# Patient Record
Sex: FEMALE | Employment: UNEMPLOYED | URBAN - METROPOLITAN AREA
[De-identification: names, ages, dates, MRNs, and addresses within clinical notes are randomized per-mention and may not be internally consistent; named-entity substitution may affect disease eponyms.]

---

## 2022-06-03 ENCOUNTER — TELEPHONE (OUTPATIENT)
Dept: FAMILY MEDICINE | Facility: CLINIC | Age: 27
End: 2022-06-03
Payer: COMMERCIAL

## 2022-06-03 NOTE — TELEPHONE ENCOUNTER
Reason for Call:  Same Day Appointment, Requested Provider:  anyone    PCP: No primary care provider on file.    Reason for visit: possible UTI    Duration of symptoms: n/a    Have you been treated for this in the past? No    Additional comments: n/a    Can we leave a detailed message on this number? YES    Phone number patient can be reached at: Home number on file [phone] (home)    Best Time: anytime    Call taken on 6/3/2022 at 11:41 AM by Bisi Donis

## 2022-06-08 ENCOUNTER — OFFICE VISIT (OUTPATIENT)
Dept: FAMILY MEDICINE | Facility: CLINIC | Age: 27
End: 2022-06-08
Payer: COMMERCIAL

## 2022-06-08 ENCOUNTER — LAB (OUTPATIENT)
Dept: LAB | Facility: CLINIC | Age: 27
End: 2022-06-08
Payer: COMMERCIAL

## 2022-06-08 VITALS
DIASTOLIC BLOOD PRESSURE: 85 MMHG | HEART RATE: 100 BPM | RESPIRATION RATE: 12 BRPM | HEIGHT: 60 IN | WEIGHT: 130.6 LBS | BODY MASS INDEX: 25.64 KG/M2 | SYSTOLIC BLOOD PRESSURE: 117 MMHG | OXYGEN SATURATION: 100 % | TEMPERATURE: 98.4 F

## 2022-06-08 DIAGNOSIS — Z00.00 HEALTHCARE MAINTENANCE: ICD-10-CM

## 2022-06-08 DIAGNOSIS — R30.0 DYSURIA: Primary | ICD-10-CM

## 2022-06-08 LAB
ALBUMIN SERPL-MCNC: 3.7 G/DL (ref 3.4–5)
ALP SERPL-CCNC: 71 U/L (ref 40–150)
ALT SERPL W P-5'-P-CCNC: 45 U/L (ref 0–50)
ANION GAP SERPL CALCULATED.3IONS-SCNC: 7 MMOL/L (ref 3–14)
AST SERPL W P-5'-P-CCNC: 21 U/L (ref 0–45)
BILIRUB SERPL-MCNC: 0.3 MG/DL (ref 0.2–1.3)
BUN SERPL-MCNC: 12 MG/DL (ref 7–30)
CALCIUM SERPL-MCNC: 8.7 MG/DL (ref 8.5–10.1)
CHLORIDE BLD-SCNC: 108 MMOL/L (ref 94–109)
CHOLEST SERPL-MCNC: 178 MG/DL
CO2 SERPL-SCNC: 26 MMOL/L (ref 20–32)
CREAT SERPL-MCNC: 0.61 MG/DL (ref 0.52–1.04)
FASTING STATUS PATIENT QL REPORTED: NO
GFR SERPL CREATININE-BSD FRML MDRD: >90 ML/MIN/1.73M2
GLUCOSE BLD-MCNC: 91 MG/DL (ref 70–99)
HDLC SERPL-MCNC: 40 MG/DL
LDLC SERPL CALC-MCNC: 95 MG/DL
NONHDLC SERPL-MCNC: 138 MG/DL
POTASSIUM BLD-SCNC: 3.9 MMOL/L (ref 3.4–5.3)
PROT SERPL-MCNC: 7 G/DL (ref 6.8–8.8)
SODIUM SERPL-SCNC: 141 MMOL/L (ref 133–144)
TRIGL SERPL-MCNC: 213 MG/DL
TSH SERPL DL<=0.005 MIU/L-ACNC: 1.54 MU/L (ref 0.4–4)

## 2022-06-08 PROCEDURE — 80061 LIPID PANEL: CPT | Performed by: PATHOLOGY

## 2022-06-08 PROCEDURE — 84443 ASSAY THYROID STIM HORMONE: CPT | Performed by: PATHOLOGY

## 2022-06-08 PROCEDURE — 99385 PREV VISIT NEW AGE 18-39: CPT | Performed by: NURSE PRACTITIONER

## 2022-06-08 PROCEDURE — 80053 COMPREHEN METABOLIC PANEL: CPT | Performed by: PATHOLOGY

## 2022-06-08 PROCEDURE — 36415 COLL VENOUS BLD VENIPUNCTURE: CPT | Performed by: PATHOLOGY

## 2022-06-08 RX ORDER — PAROXETINE 10 MG/1
10 TABLET, FILM COATED ORAL EVERY MORNING
COMMUNITY
End: 2022-07-08

## 2022-06-08 ASSESSMENT — ENCOUNTER SYMPTOMS
HEMATURIA: 0
DYSURIA: 1
SHORTNESS OF BREATH: 0
PALPITATIONS: 0
FEVER: 0
COUGH: 0
FATIGUE: 0
CHILLS: 0

## 2022-06-08 NOTE — PROGRESS NOTES
Today's Date: Jun 8, 2022     Patient Malika Garcia 1995 presents to the clinic today to address   Chief Complaint   Patient presents with     Physical     Has a referral for a urologist but hasn't been able to contact would like assistance   Would like routine blood work              SUBJECTIVE     History of Present Illness:    26 year old female presents to clinic to establish care, physical, and to discuss dysuria. Patient recently moved to [place] from [place] for her 's work. She is going to start working at [employer] in the next few weeks. She reports a history of environmental allergies (allergic to her dog) with nasal congestion but is currently not on any medications for allergies.      Patient has been dealing with dysuria since 5/12/22.  She was initially treated via virtual MD who prescribed macrobid. She completed the course of macrobid, however, the dysuria persisted and she went to an urgent care where she was prescribed bactrim. The dysuria continued to persist and she went to the ED on 6/4/22 where she was discharged after UA, urine pregnancy test, and pelvic US were WNL. She was referred to [facility] but has not received a callback. She currently denies pelvic pain, vaginal discharge, hematuria. Denies new sexual partners. No other acute concerns/complaints at time of exam.      Review of Systems   Constitutional: Negative for chills, fatigue and fever.   Respiratory: Negative for cough and shortness of breath.    Cardiovascular: Negative for chest pain and palpitations.   Genitourinary: Positive for dysuria. Negative for hematuria, pelvic pain, vaginal bleeding and vaginal discharge.   Allergic/Immunologic: Positive for environmental allergies.   Constitutional, HEENT, cardiovascular, pulmonary, gi and gu systems are negative, except as otherwise noted.      Allergies   Allergen Reactions     Penicillins      Hives, itching, bruising      Venlafaxine      Redness and itching     "    Current Outpatient Medications   Medication Instructions     PARoxetine (PAXIL) 10 mg, Oral, EVERY MORNING, Every morning       Past Medical History:   Diagnosis Date     Anxiety      Bilateral ovarian cysts 2015        Family History   Problem Relation Age of Onset     Hypertension Mother      Diabetes Maternal Grandfather      No Known Problems Half-Sister      No Known Problems Half-Sister      No Known Problems Half-Sister         Social History     Tobacco Use     Smoking status: Never Smoker     Smokeless tobacco: Never Used   Vaping Use     Vaping Use: Never used   Substance Use Topics     Alcohol use: Not Currently     Drug use: Never        History   Sexual Activity     Sexual activity: Yes     Partners: Male     Birth control/ protection: Implant        No flowsheet data found.       There is no immunization history on file for this patient.     Health Maintenance components reviewed -  Covid-19 vaccine status is up to date(2 doses, overdue for 3rd.)  Pap- Normal 2021  TDAP- 2018             OBJECTIVE     /85 (BP Location: Right arm, Patient Position: Sitting, Cuff Size: Adult Regular)   Pulse 100   Temp 98.4  F (36.9  C) (Oral)   Resp 12   Ht 1.535 m (5' 0.43\")   Wt 59.2 kg (130 lb 9.6 oz)   SpO2 100%   BMI 25.14 kg/m       Labs:  No results found for: WBC, HGB, HCT, PLT, CHOL, TRIG, HDL, LDLDIRECT, ALT, AST, NA, CREATININE, BUN, CO2, TSH, PSA, INR, GLUF, HGBA1C, MICROALBUR     Physical Exam  Constitutional:       General: She is not in acute distress.     Appearance: She is not ill-appearing.   HENT:      Head: Normocephalic.      Right Ear: Tympanic membrane normal.      Left Ear: Tympanic membrane normal.      Nose: Nose normal. No congestion.      Mouth/Throat:      Mouth: Mucous membranes are moist.      Pharynx: No oropharyngeal exudate or posterior oropharyngeal erythema.   Eyes:      Extraocular Movements: Extraocular movements intact.      Pupils: Pupils are equal, round, and " reactive to light.   Cardiovascular:      Rate and Rhythm: Normal rate and regular rhythm.      Heart sounds: Normal heart sounds. No murmur heard.    No friction rub. No gallop.   Pulmonary:      Effort: Pulmonary effort is normal. No respiratory distress.      Breath sounds: Normal breath sounds. No wheezing or rhonchi.   Abdominal:      General: Abdomen is flat. Bowel sounds are normal.      Palpations: Abdomen is soft.      Tenderness: There is no abdominal tenderness. There is no right CVA tenderness or left CVA tenderness.   Genitourinary:     Comments: Exam deferred by pt.  Musculoskeletal:         General: Normal range of motion.      Cervical back: Normal range of motion and neck supple.      Right lower leg: No edema.      Left lower leg: No edema.   Lymphadenopathy:      Cervical: No cervical adenopathy.   Skin:     General: Skin is warm and dry.   Neurological:      General: No focal deficit present.      Mental Status: She is alert.   Psychiatric:         Thought Content: Thought content normal.         Judgment: Judgment normal.               ASSESSMENT/PLAN     1. Dysuria  Persistent dysuria despite 2 rounds of antibiotics. Recent ED visit with negative urine pregnancy, UA, and pelvic US. Advised STI testing, patient declined STI testing today. Refer to Urology for further eval.   - Adult Urology Referral    2. Healthcare maintenance  Advised 3rd dose of COVID. TDAP UTD. Pap 2021 WNL per patient, declines repeat pap. Check labs as noted below. Advised OTC antihistamine and flonase if dog allergies worsen.  - Comprehensive metabolic panel; Future  - TSH with free T4 reflex; Future  - Lipid panel reflex to direct LDL Non-fasting; Future        Follow-Up:  - Follow up in 1 year, or sooner if symptoms worsen or fail to improve.     Options for treatment and follow-up care were reviewed with the patient. Patient engaged in the decision making process and verbalized understanding of the options discussed  and agreed with the final plan.  AVS printed and given to patient.    NASIM Zaidi [facility]  [address] [address] [phone]

## 2022-06-08 NOTE — NURSING NOTE
Chief Complaint   Patient presents with     Physical     Has a referral for a urologist but hasn't been able to contact would like assistance   Would like routine blood work

## 2022-06-09 ENCOUNTER — TELEPHONE (OUTPATIENT)
Dept: UROLOGY | Facility: CLINIC | Age: 27
End: 2022-06-09
Payer: COMMERCIAL

## 2022-06-09 NOTE — TELEPHONE ENCOUNTER
[facility] Call Center    Phone Message    May a detailed message be left on voicemail: yes     Reason for Call: Other: Pt called to schedule for referral for Dysuria. Referred by Victor Hugo Watkins APRN CNP.   Priority is 1-2 week.   First available writer sees is 7/13 Darron, 7/14 [facility].  Please give pt call back for sooner appt per referral    Action Taken: Message routed to:  [facility]    Travel Screening: Not Applicable

## 2022-06-13 ENCOUNTER — PRE VISIT (OUTPATIENT)
Dept: UROLOGY | Facility: CLINIC | Age: 27
End: 2022-06-13

## 2022-06-13 NOTE — CONFIDENTIAL NOTE
Reason for visit: consult     Relevant information: dysuria    Records/imaging/labs/orders: in epic    Pt called: n/a    At Rooming: maria fernanda Maradiaga  6/13/2022  8:03 AM

## 2022-06-17 ENCOUNTER — VIRTUAL VISIT (OUTPATIENT)
Dept: UROLOGY | Facility: CLINIC | Age: 27
End: 2022-06-17
Payer: COMMERCIAL

## 2022-06-17 DIAGNOSIS — R30.0 DYSURIA: Primary | ICD-10-CM

## 2022-06-17 DIAGNOSIS — R10.30 ABDOMINAL PAIN, LOWER: ICD-10-CM

## 2022-06-17 PROCEDURE — 99203 OFFICE O/P NEW LOW 30 MIN: CPT | Mod: GT | Performed by: PHYSICIAN ASSISTANT

## 2022-06-17 NOTE — LETTER
6/17/2022       RE: Malika Garcia  [address]  [facility] [address]     Dear Colleague,    Thank you for referring your patient, Malika Garcia, to the [facility] [place] [facility]. Please see a copy of my visit note below.    Malika is a 26 year old who is being evaluated via a billable video visit.      How would you like to obtain your AVS? MyChart  If the video visit is dropped, the invitation should be resent by: Send to e-mail at: [email or web address]  Will anyone else be joining your video visit? No      Video-Visit Details     Type of service:  Video Visit     Video Start Time: 10:31 AM    Video End Time: 10:46 AM    Originating Location (pt. Location): Home     Distant Location (provider location):  [facility]      Platform used for Video Visit: [employer]ide[employer]            Chief Complaint:   Dysuria          History of Present Illness:   Malika Garcia is a 26 year old female with a history of anxiety and bilateral ovarian cysts s/p cyst resection who presents for evaluation of dysuria.  Patient reporting ongoing dysuria since 5/12/2022.  She was initially treated via virtual MD who prescribed Macrobid.  However, despite antibiotic treatment, the dysuria persisted and she went to an urgent care where she was prescribed Bactrim after it was identified that the Macrobid was resistant.  The patient reports symptoms improved on the Bactrim, but then started recurring before she was done with the antibiotic.  The dysuria again continued to persist and she most recently went to the [facility] ED on 6/4/2022 where urinalysis was normal, UPT negative, and urine pregnancy test, and pelvic US was unremarkable.  Patient admits to current symptoms of abdominal pain, and intermittent dysuria, which she relates more to caffeine use.  She denies any hematuria.  She does admit to occasional frequency but states that she is  currently drinking a lot of water to stay hydrated.  No fevers or chills.  No concern for STDs.     Of note, the reports about 5 years ago she had an ovarian cyst rupture and  to urinary retention for which a catheter had to be placed.  Since that time she does not get the sensation to void and instead feels pressure within her bladder when she has to void.  She feels that she is fully emptying her bladder to the best of her knowledge.           Past Medical History:     Past Medical History:   Diagnosis Date     Anxiety      Bilateral ovarian cysts 2015            Past Surgical History:     Past Surgical History:   Procedure Laterality Date     AS REMOVAL OF OVARIAN CYST(S) Left 2015            Medications     Current Outpatient Medications   Medication     PARoxetine (PAXIL) 10 MG tablet     No current facility-administered medications for this visit.            Family History:     Family History   Problem Relation Age of Onset     Hypertension Mother      Diabetes Maternal Grandfather      No Known Problems Half-Sister      No Known Problems Half-Sister      No Known Problems Half-Sister             Social History:     Social History     Socioeconomic History     Marital status:      Spouse name: Not on file     Number of children: 0     Years of education: Not on file     Highest education level: Not on file   Occupational History     Comment: [employer]- [employer]   Tobacco Use     Smoking status: Never Smoker     Smokeless tobacco: Never Used   Vaping Use     Vaping Use: Never used   Substance and Sexual Activity     Alcohol use: Not Currently     Drug use: Never     Sexual activity: Yes     Partners: Male     Birth control/protection: Implant   Other Topics Concern     Not on file   Social History Narrative     Not on file     Social Determinants of Health     Financial Resource Strain: Not on file   Food Insecurity: Not on file   Transportation Needs: Not on file   Physical Activity: Not on  file   Stress: Not on file   Social Connections: Not on file   Intimate Partner Violence: Not on file   Housing Stability: Not on file            Allergies:   Penicillins, Venlafaxine, and Dogs         Review of Systems:  From intake questionnaire   Negative 14 system review except as noted on HPI, nurse's note.         Physical Exam:   Patient is a 26 year old  female    General Appearance Adult: Alert, no acute distress, oriented  Lungs: no respiratory distress, or pursed lip breathing  Heart: No obvious jugular venous distension present  Skin: no suspicious lesions or rashes  Neuro: Alert, oriented, speech and mentation normal  Further examination is deferred due to the nature of our visit.        Labs and Pathology:    I personally reviewed all applicable laboratory data and went over findings with patient  Significant for:    CBC RESULTS:  No results for input(s): WBC, HGB, PLT in the last 87231 hours.     BMP RESULTS:  Recent Labs   Lab Test 06/08/22  1127      POTASSIUM 3.9   CHLORIDE 108   CO2 26   ANIONGAP 7   GLC 91   BUN 12   CR 0.61   GFRESTIMATED >90   ORION 8.7       UA RESULTS:   No results for input(s): SG, URINEPH, NITRITE, RBCU, WBCU in the last 83599 hours.    PSA RESULTS  No results found for: PSA      Imaging:    I personally reviewed all applicable imaging and went over findings with patient.  Significant for:    Pelvic US (6/4/2022):  INDICATION: Urinary tract infection     TECHNIQUE: Ultrasound pelvis transabdominal and transvaginal. Endovaginal imaging was performed to better visualize the endometrium and ovaries. Real-time gray scale sonographic images with spectral and color Doppler imaging of the ovaries were obtained.     COMPARISON: None     FINDINGS:     Uterus: 4.6 x 2 x 2.5 cm. Normal echotexture of the myometrium noted with no masses are seen.     Endometrium: Thin and echogenic measuring 1-2 mm. The measurement reported by the sonographer of 4 mm includes the hypoechoic fluid  in the endocervical canal. A small amount of fluid is present within the endocervical canal.     Right ovary: 2 x 2.6 x 1.8 cm. The right ovary is normal in appearance and echotexture. Normal arterial and venous blood flow seen in the right ovary.     Left ovary: Patient is status post left oophorectomy.     Cul-de-sac: No significant ascites noted.     IMPRESSION:   1. Unremarkable pelvic ultrasound.            Assessment and Plan:     Assessment: 26 year old female with symptoms of lower abdominal pain and intermittent dysuria which has been ongoing for the past month.  By patient report, she was initially seen and tested positive for UTI on 5/12/2022, but treatment with Macrobid did not change symptoms.  The patient was subsequently switched to Bactrim with improvement in symptoms, but then they recurred near the end of treatment and persist at this time.  Recent UA unremarkable from 6/4/2022 and pelvic US normal from that date.  At this time, we will plan for UA/UC along with ureaplasma and mycoplasma culture to rule out infectious etiology.  If urine studies are negative, then would recommend nurse visit for PVR and follow up visit to discuss other etiology of symptoms.  Could consider pelvic floor dysfunction and possible referral to PFPT, or UDS given inability to feel urge to void following catheter placement several years ago.     Plan:  - Schedule lab visit with any [facility] for urinalysis, urine culture, and ureaplasma and mycoplasma culture.   - Schedule nurse visit later next week or early the following week for PVR.   - Schedule follow up visit with me after the nurse visit to review results and urine tests.       CIERRA Romero  Department of Urology

## 2022-06-17 NOTE — PROGRESS NOTES
Malika is a 26 year old who is being evaluated via a billable video visit.      How would you like to obtain your AVS? MyChart  If the video visit is dropped, the invitation should be resent by: Send to e-mail at: [email or web address]  Will anyone else be joining your video visit? No      Video-Visit Details     Type of service:  Video Visit     Video Start Time: 10:31 AM    Video End Time: 10:46 AM    Originating Location (pt. Location): Home     Distant Location (provider location):  [facility] [place]      Platform used for Video Visit: [employer]            Chief Complaint:   Dysuria          History of Present Illness:   Malika Garcia is a 26 year old female with a history of anxiety and bilateral ovarian cysts s/p cyst resection who presents for evaluation of dysuria.  Patient reporting ongoing dysuria since 5/12/2022.  She was initially treated via virtual MD who prescribed Macrobid.  However, despite antibiotic treatment, the dysuria persisted and she went to an urgent care where she was prescribed Bactrim after it was identified that the Macrobid was resistant.  The patient reports symptoms improved on the Bactrim, but then started recurring before she was done with the antibiotic.  The dysuria again continued to persist and she most recently went to the [facility] ED on 6/4/2022 where urinalysis was normal, UPT negative, and urine pregnancy test, and pelvic US was unremarkable.  Patient admits to current symptoms of abdominal pain, and intermittent dysuria, which she relates more to caffeine use.  She denies any hematuria.  She does admit to occasional frequency but states that she is currently drinking a lot of water to stay hydrated.  No fevers or chills.  No concern for STDs.     Of note, the reports about 5 years ago she had an ovarian cyst rupture and  to urinary retention for which a catheter had to be placed.  Since that time she does not get the sensation to void and instead  feels pressure within her bladder when she has to void.  She feels that she is fully emptying her bladder to the best of her knowledge.           Past Medical History:     Past Medical History:   Diagnosis Date     Anxiety      Bilateral ovarian cysts 2015            Past Surgical History:     Past Surgical History:   Procedure Laterality Date     AS REMOVAL OF OVARIAN CYST(S) Left 2015            Medications     Current Outpatient Medications   Medication     PARoxetine (PAXIL) 10 MG tablet     No current facility-administered medications for this visit.            Family History:     Family History   Problem Relation Age of Onset     Hypertension Mother      Diabetes Maternal Grandfather      No Known Problems Half-Sister      No Known Problems Half-Sister      No Known Problems Half-Sister             Social History:     Social History     Socioeconomic History     Marital status:      Spouse name: Not on file     Number of children: 0     Years of education: Not on file     Highest education level: Not on file   Occupational History     Comment: [employer]- [employer]   Tobacco Use     Smoking status: Never Smoker     Smokeless tobacco: Never Used   Vaping Use     Vaping Use: Never used   Substance and Sexual Activity     Alcohol use: Not Currently     Drug use: Never     Sexual activity: Yes     Partners: Male     Birth control/protection: Implant   Other Topics Concern     Not on file   Social History Narrative     Not on file     Social Determinants of Health     Financial Resource Strain: Not on file   Food Insecurity: Not on file   Transportation Needs: Not on file   Physical Activity: Not on file   Stress: Not on file   Social Connections: Not on file   Intimate Partner Violence: Not on file   Housing Stability: Not on file            Allergies:   Penicillins, Venlafaxine, and Dogs         Review of Systems:  From intake questionnaire   Negative 14 system review except as noted on HPI, nurse's  note.         Physical Exam:   Patient is a 26 year old  female    General Appearance Adult: Alert, no acute distress, oriented  Lungs: no respiratory distress, or pursed lip breathing  Heart: No obvious jugular venous distension present  Skin: no suspicious lesions or rashes  Neuro: Alert, oriented, speech and mentation normal  Further examination is deferred due to the nature of our visit.        Labs and Pathology:    I personally reviewed all applicable laboratory data and went over findings with patient  Significant for:    CBC RESULTS:  No results for input(s): WBC, HGB, PLT in the last 76092 hours.     BMP RESULTS:  Recent Labs   Lab Test 06/08/22  1127      POTASSIUM 3.9   CHLORIDE 108   CO2 26   ANIONGAP 7   GLC 91   BUN 12   CR 0.61   GFRESTIMATED >90   ORION 8.7       UA RESULTS:   No results for input(s): SG, URINEPH, NITRITE, RBCU, WBCU in the last 48171 hours.    PSA RESULTS  No results found for: PSA      Imaging:    I personally reviewed all applicable imaging and went over findings with patient.  Significant for:    Pelvic US (6/4/2022):  INDICATION: Urinary tract infection     TECHNIQUE: Ultrasound pelvis transabdominal and transvaginal. Endovaginal imaging was performed to better visualize the endometrium and ovaries. Real-time gray scale sonographic images with spectral and color Doppler imaging of the ovaries were obtained.     COMPARISON: None     FINDINGS:     Uterus: 4.6 x 2 x 2.5 cm. Normal echotexture of the myometrium noted with no masses are seen.     Endometrium: Thin and echogenic measuring 1-2 mm. The measurement reported by the sonographer of 4 mm includes the hypoechoic fluid in the endocervical canal. A small amount of fluid is present within the endocervical canal.     Right ovary: 2 x 2.6 x 1.8 cm. The right ovary is normal in appearance and echotexture. Normal arterial and venous blood flow seen in the right ovary.     Left ovary: Patient is status post left oophorectomy.      Cul-de-sac: No significant ascites noted.     IMPRESSION:   1. Unremarkable pelvic ultrasound.            Assessment and Plan:     Assessment: 26 year old female with symptoms of lower abdominal pain and intermittent dysuria which has been ongoing for the past month.  By patient report, she was initially seen and tested positive for UTI on 5/12/2022, but treatment with Macrobid did not change symptoms.  The patient was subsequently switched to Bactrim with improvement in symptoms, but then they recurred near the end of treatment and persist at this time.  Recent UA unremarkable from 6/4/2022 and pelvic US normal from that date.  At this time, we will plan for UA/UC along with ureaplasma and mycoplasma culture to rule out infectious etiology.  If urine studies are negative, then would recommend nurse visit for PVR and follow up visit to discuss other etiology of symptoms.  Could consider pelvic floor dysfunction and possible referral to PFPT, or UDS given inability to feel urge to void following catheter placement several years ago.     Plan:  - Schedule lab visit with any [facility] for urinalysis, urine culture, and ureaplasma and mycoplasma culture.   - Schedule nurse visit later next week or early the following week for PVR.   - Schedule follow up visit with me after the nurse visit to review results and urine tests.       CIERRA Romero  Department of Urology

## 2022-06-17 NOTE — PATIENT INSTRUCTIONS
UROLOGY CLINIC VISIT PATIENT INSTRUCTIONS    - Schedule lab visit with any [facility] for urinalysis, urine culture, and ureaplasma and mycoplasma culture.   - Schedule nurse visit later next week or early the following week for PVR.   - Schedule follow up visit with me after the nurse visit to review results and urine tests.     If you have any issues, questions or concerns in the meantime, do not hesitate to contact us at [phone] or via [employer].     It was a pleasure meeting with you today.  Thank you for allowing me and my team the privilege of caring for you today.  YOU are the reason we are here, and I truly hope we provided you with the excellent service you deserve.  Please let us know if there is anything else we can do for you so that we can be sure you are leaving completely satisfied with your care experience.    Bisi Mckeon PA-C  Department of Urology

## 2022-07-07 ENCOUNTER — PRE VISIT (OUTPATIENT)
Dept: UROLOGY | Facility: CLINIC | Age: 27
End: 2022-07-07

## 2022-07-07 NOTE — CONFIDENTIAL NOTE
Reason for visit: follow-up symptom check     Relevant information: dysuria, lower abdominal pain    Records/imaging/labs/orders: lab and nurse visit scheduled 7/8/2022    Pt called: n/a    At Rooming: maria fernanda Maradiaga  7/7/2022  10:41 AM

## 2022-07-08 ENCOUNTER — LAB (OUTPATIENT)
Dept: LAB | Facility: CLINIC | Age: 27
End: 2022-07-08

## 2022-07-08 ENCOUNTER — OFFICE VISIT (OUTPATIENT)
Dept: UROLOGY | Facility: CLINIC | Age: 27
End: 2022-07-08
Payer: COMMERCIAL

## 2022-07-08 ENCOUNTER — VIRTUAL VISIT (OUTPATIENT)
Dept: FAMILY MEDICINE | Facility: CLINIC | Age: 27
End: 2022-07-08

## 2022-07-08 DIAGNOSIS — R30.0 DYSURIA: ICD-10-CM

## 2022-07-08 DIAGNOSIS — R30.0 DYSURIA: Primary | ICD-10-CM

## 2022-07-08 DIAGNOSIS — R10.30 ABDOMINAL PAIN, LOWER: ICD-10-CM

## 2022-07-08 DIAGNOSIS — F41.9 ANXIETY: Primary | ICD-10-CM

## 2022-07-08 LAB
ALBUMIN UR-MCNC: NEGATIVE MG/DL
APPEARANCE UR: CLEAR
BILIRUB UR QL STRIP: NEGATIVE
COLOR UR AUTO: YELLOW
GLUCOSE UR STRIP-MCNC: NEGATIVE MG/DL
HGB UR QL STRIP: NEGATIVE
KETONES UR STRIP-MCNC: NEGATIVE MG/DL
LEUKOCYTE ESTERASE UR QL STRIP: NEGATIVE
Lab: NORMAL
MUCOUS THREADS #/AREA URNS LPF: PRESENT /LPF
NITRATE UR QL: NEGATIVE
PERFORMING LABORATORY: NORMAL
PH UR STRIP: 6 [PH] (ref 5–7)
RBC URINE: <1 /HPF
SP GR UR STRIP: 1.02 (ref 1–1.03)
SQUAMOUS EPITHELIAL: 5 /HPF
TEST NAME: NORMAL
UROBILINOGEN UR STRIP-MCNC: NORMAL MG/DL
WBC URINE: 3 /HPF

## 2022-07-08 PROCEDURE — 51798 US URINE CAPACITY MEASURE: CPT

## 2022-07-08 PROCEDURE — 87088 URINE BACTERIA CULTURE: CPT | Performed by: PHYSICIAN ASSISTANT

## 2022-07-08 PROCEDURE — 84999 UNLISTED CHEMISTRY PROCEDURE: CPT | Performed by: PHYSICIAN ASSISTANT

## 2022-07-08 PROCEDURE — 87798 DETECT AGENT NOS DNA AMP: CPT | Performed by: PHYSICIAN ASSISTANT

## 2022-07-08 PROCEDURE — 99213 OFFICE O/P EST LOW 20 MIN: CPT | Mod: GT | Performed by: NURSE PRACTITIONER

## 2022-07-08 PROCEDURE — 81001 URINALYSIS AUTO W/SCOPE: CPT | Performed by: PATHOLOGY

## 2022-07-08 PROCEDURE — 87109 MYCOPLASMA: CPT | Performed by: PHYSICIAN ASSISTANT

## 2022-07-08 PROCEDURE — 87563 M. GENITALIUM AMP PROBE: CPT | Performed by: PHYSICIAN ASSISTANT

## 2022-07-08 RX ORDER — NITROFURANTOIN 25; 75 MG/1; MG/1
100 CAPSULE ORAL 2 TIMES DAILY
COMMUNITY
Start: 2022-05-19 | End: 2022-08-16

## 2022-07-08 RX ORDER — HYDROXYZINE PAMOATE 25 MG/1
CAPSULE ORAL
COMMUNITY
End: 2022-07-08

## 2022-07-08 RX ORDER — SULFAMETHOXAZOLE/TRIMETHOPRIM 800-160 MG
TABLET ORAL
COMMUNITY
Start: 2022-05-27 | End: 2022-08-16

## 2022-07-08 RX ORDER — HYDROXYZINE PAMOATE 25 MG/1
25 CAPSULE ORAL 3 TIMES DAILY PRN
Qty: 30 CAPSULE | Refills: 1 | Status: SHIPPED | OUTPATIENT
Start: 2022-07-08 | End: 2023-03-01

## 2022-07-08 RX ORDER — BUSPIRONE HYDROCHLORIDE 5 MG/1
TABLET ORAL
COMMUNITY
End: 2022-07-08

## 2022-07-08 RX ORDER — ADAPALENE 45 G/G
GEL TOPICAL EVERY 24 HOURS
COMMUNITY
End: 2023-01-01

## 2022-07-08 RX ORDER — CYCLOBENZAPRINE HCL 5 MG
TABLET ORAL
COMMUNITY
Start: 2022-05-27 | End: 2024-02-28

## 2022-07-08 RX ORDER — PAROXETINE 10 MG/1
10 TABLET, FILM COATED ORAL EVERY MORNING
Qty: 90 TABLET | Refills: 1 | Status: SHIPPED | OUTPATIENT
Start: 2022-07-08 | End: 2023-03-01

## 2022-07-08 NOTE — PROGRESS NOTES
Malika is a 26 year old who is being evaluated via a billable video visit.      How would you like to obtain your AVS? MyChart  If the video visit is dropped, the invitation should be resent by: Text to cell phone: [phone]  Will anyone else be joining your video visit? No    Visit switched from Video to Telephone due to connectivity issues.      Subjective   Malika is a 26 year old presenting for the following health issues: med refills    HPI:    26-year-old female presents to clinic for medation refills. Patient reports history of chronic anxiety and panic attacks. Patient reports panic attacks are infrequent and she is able to control them without medication. She previously saw a Psychiatrist in [place] who prescribed her Paxil and hydroxyzine prn. Patient reports that her anxiety is currently controlled with Paxil, especially since she has stopped working. She denies need for medication titration. Denies SI/HI. No other acute concerns/symptoms at time of exam.           Review of Systems   Constitutional, HEENT, cardiovascular, pulmonary, gi and gu systems are negative, except as otherwise noted.    PHQ-2 Score:     PHQ-2 ( 1999 Pfizer) 7/8/2022 6/17/2022   Q1: Little interest or pleasure in doing things 0 0   Q2: Feeling down, depressed or hopeless 0 0   PHQ-2 Score 0 0   Q1: Little interest or pleasure in doing things - Not at all   Q2: Feeling down, depressed or hopeless - Not at all   PHQ-2 Score - 0     Current Outpatient Medications   Medication     hydrOXYzine (VISTARIL) 25 MG capsule     PARoxetine (PAXIL) 10 MG tablet     adapalene (DIFFERIN) 0.1 % external gel     cyclobenzaprine (FLEXERIL) 5 MG tablet     nitroFURantoin macrocrystal-monohydrate (MACROBID) 100 MG capsule     sulfamethoxazole-trimethoprim (BACTRIM DS) 800-160 MG tablet     No current facility-administered medications for this visit.             Objective           Vitals:  No vitals were obtained today due to virtual  visit.    Physical Exam   GENERAL: Healthy, alert and no distress  RESP: No audible wheeze, cough,.  Patient able to speak in clear, coherent sentences without interruption.   PSYCH: Mentation appears normal, affect normal/bright, judgement and insight intact, normal speech.    Assessment/Plan  1. Anxiety  Symptoms currently quiescent with selective serotonin reuptake inhibitor therapy. Denies SI/HI. Denies need for medication titration. Refills sent. Advised patient to reach out if her symptoms escalate or she feels the need to see Psych and/or therapist, will gladly provide a referral.   - PARoxetine (PAXIL) 10 MG tablet; Take 1 tablet (10 mg) by mouth every morning Every morning  Dispense: 90 tablet; Refill: 1  - hydrOXYzine (VISTARIL) 25 MG capsule; Take 1 capsule (25 mg) by mouth 3 times daily as needed for itching  Dispense: 30 capsule; Refill: 1      All questions/concerns addressed. Patient stated understanding/agreement to plan of care.    NASIM Zaidi, CNP  [facility] [employer] [facility]      Type of service:  Telephone Visit    Lenth of call: 9 minutes    Distant Location (provider location):  [facility] [place]

## 2022-07-08 NOTE — PROGRESS NOTES
Malika Garcia comes into clinic today at the request of Bisi Mckeon PA-C with the diagnosis of dysuria for a PVR/Flow (uroflow).    Urinary Flow Rate  Residual Volume by Ultrasound: 0 mL     Ordering provider notified via inbasket.    This service provided today was under the supervising provider of the day Dr. Hudson Zuluaga, who was available if needed.    Korin Underwood, EMT

## 2022-07-12 ENCOUNTER — VIRTUAL VISIT (OUTPATIENT)
Dept: UROLOGY | Facility: CLINIC | Age: 27
End: 2022-07-12
Payer: COMMERCIAL

## 2022-07-12 DIAGNOSIS — R30.0 DYSURIA: Primary | ICD-10-CM

## 2022-07-12 DIAGNOSIS — R10.30 ABDOMINAL PAIN, LOWER: ICD-10-CM

## 2022-07-12 LAB — MISCELLANEOUS TEST 1 (ARUP): ABNORMAL

## 2022-07-12 PROCEDURE — 99213 OFFICE O/P EST LOW 20 MIN: CPT | Mod: GT | Performed by: PHYSICIAN ASSISTANT

## 2022-07-12 NOTE — PROGRESS NOTES
Chief Complaint:   Follow up          History of Present Illness:   Malika Garcia is a 26 year old female with a history of anxiety and bilateral ovarian cysts s/p cyst resection who presents for follow up of dysuria.  Patient was initially seen via virtual visit on 6/17/2022 reporting ongoing dysuria since 5/12/2022.  She was initially treated via virtual MD who prescribed Macrobid.  However, despite antibiotic treatment, the dysuria persisted and she went to an urgent care where she was prescribed Bactrim after it was identified that the Macrobid was resistant.  The patient reports symptoms improved on the Bactrim, but then started recurring before she was done with the antibiotic.  The dysuria again continued to persist and she most recently went to the [facility] ED on 6/4/2022 where urinalysis was normal, UPT negative, and pelvic US was unremarkable.  Patient admits to current symptoms of abdominal pain, and intermittent dysuria, which she relates more to caffeine use.  She denies any hematuria.  She does admit to occasional frequency but states that she is currently drinking a lot of water to stay hydrated.  She was recommended for urinalysis, urine culture, and ureaplasma and mycoplasma culture to rule out infectious etiology.  Urine culture showing low grade <10,000 CFU/mL Group B Strep, and ureaplasma and mycoplasma culture still in process.  PVR from 7/8/2022 showing complete bladder emptying with PVR 0 ml.      In the interim, the patient reports her abdominal discomfort is a little better and is usually related to movements such as carrying her dog, or laying on her side in bed.  She reports the dysuria is improving at this time and is not as noticeable or persistent as it has been in the past.  No urinary urgency but states occasionally she will have a lower urine output even though she feels she has to void.           Past Medical History:     Past Medical History:   Diagnosis Date     Anxiety       Bilateral ovarian cysts 2015            Past Surgical History:     Past Surgical History:   Procedure Laterality Date     AS REMOVAL OF OVARIAN CYST(S) Left 2015            Medications     Current Outpatient Medications   Medication     adapalene (DIFFERIN) 0.1 % external gel     cyclobenzaprine (FLEXERIL) 5 MG tablet     hydrOXYzine (VISTARIL) 25 MG capsule     nitroFURantoin macrocrystal-monohydrate (MACROBID) 100 MG capsule     PARoxetine (PAXIL) 10 MG tablet     sulfamethoxazole-trimethoprim (BACTRIM DS) 800-160 MG tablet     No current facility-administered medications for this visit.            Family History:     Family History   Problem Relation Age of Onset     Hypertension Mother      Diabetes Maternal Grandfather      No Known Problems Half-Sister      No Known Problems Half-Sister      No Known Problems Half-Sister             Social History:     Social History     Socioeconomic History     Marital status:      Spouse name: Not on file     Number of children: 0     Years of education: Not on file     Highest education level: Not on file   Occupational History     Comment: [employer]- [employer]   Tobacco Use     Smoking status: Never Smoker     Smokeless tobacco: Never Used   Vaping Use     Vaping Use: Never used   Substance and Sexual Activity     Alcohol use: Not Currently     Drug use: Never     Sexual activity: Yes     Partners: Male     Birth control/protection: Implant   Other Topics Concern     Not on file   Social History Narrative     Not on file     Social Determinants of Health     Financial Resource Strain: Not on file   Food Insecurity: Not on file   Transportation Needs: Not on file   Physical Activity: Not on file   Stress: Not on file   Social Connections: Not on file   Intimate Partner Violence: Not on file   Housing Stability: Not on file            Allergies:   Penicillins, Venlafaxine, and Dogs         Review of Systems:  From intake questionnaire   Negative 14 system review  except as noted on HPI, nurse's note.         Physical Exam:   Patient is a 26 year old  female    General Appearance Adult: Alert, no acute distress, oriented  Lungs: no respiratory distress, or pursed lip breathing  Heart: No obvious jugular venous distension present  Skin: no suspicious lesions or rashes  Neuro: Alert, oriented, speech and mentation normal  Further examination is deferred due to the nature of our visit.        Labs and Pathology:    I personally reviewed all applicable laboratory data and went over findings with patient  Significant for:    CBC RESULTS:  No results for input(s): WBC, HGB, PLT in the last 22633 hours.     BMP RESULTS:  Recent Labs   Lab Test 06/08/22  1127      POTASSIUM 3.9   CHLORIDE 108   CO2 26   ANIONGAP 7   GLC 91   BUN 12   CR 0.61   GFRESTIMATED >90   ORION 8.7       UA RESULTS:   Recent Labs   Lab Test 07/08/22  0850   SG 1.025   URINEPH 6.0   NITRITE Negative   RBCU <1   WBCU 3         Imaging:    I personally reviewed all applicable imaging and went over findings with patient.  Significant for:    No results found for this or any previous visit.           Assessment and Plan:     Assessment: 26 year old female with symptoms of lower abdominal pain and intermittent dysuria which has been ongoing for the past month.  By patient report, she was initially seen and tested positive for UTI on 5/12/2022, but treatment with Macrobid did not change symptoms.  The patient was subsequently switched to Bactrim with improvement in symptoms, but then they recurred near the end of treatment and persist at this time.  Recent UA unremarkable from 6/4/2022 and pelvic US normal from that date.  Patient was referred for infectious work up with UA/UC, which was completed 7/8/2022 with urine culture positive for <10,000 CFU/mL Group B Strep.  Difficult to know if this is pathologic infection, as bacteria can be seen within the urogenital tract.  However, given her symptoms will plan to  treat; urine culture susceptibilities pending at this time given patient allergy to penicillin, and patient unsure if she has taken cephalosporins in the past.  Ureaplasma and mycoplasma culture pending at this time.  Given mild improvement in symptoms in the interim, will plan for follow up in 4-6 weeks for recheck following antibiotic treatment.  If symptoms persistent despite antibiotic therapy, could consider further evaluation with cystoscopy or UDS for further evaluation.      Plan:  - Await finalized urine culture and ureaplasma/mycoplasma culture.   - Follow up with an ANALY in 4-6 weeks for recheck of symptoms.       CIERRA Romero  Department of Urology

## 2022-07-12 NOTE — PATIENT INSTRUCTIONS
UROLOGY CLINIC VISIT PATIENT INSTRUCTIONS    - I'll let you know the results of the urine cultures once they are finalized.    - Follow up with an ANALY in 4-6 weeks for recheck of symptoms.     If you have any issues, questions or concerns in the meantime, do not hesitate to contact us at [phone] or via StudyRoom.     It was a pleasure meeting with you today.  Thank you for allowing me and my team the privilege of caring for you today.  YOU are the reason we are here, and I truly hope we provided you with the excellent service you deserve.  Please let us know if there is anything else we can do for you so that we can be sure you are leaving completely satisfied with your care experience.    Bisi Mckeon PA-C  Department of Urology

## 2022-07-12 NOTE — LETTER
7/12/2022       RE: Malika Garcia  [address]  [place] [facility] [place] [address]     Dear Colleague,    Thank you for referring your patient, Malika Garcia, to the [facility] [place] at [facility]. Please see a copy of my visit note below.    Malika is a 26 year old who is being evaluated via a billable video visit.      How would you like to obtain your AVS? MyChart  If the video visit is dropped, the invitation should be resent by: Text to cell phone: [phone]  Will anyone else be joining your video visit? No        Video-Visit Details    Video Start Time: 10:09 AM    Type of service:  Video Visit    Video End Time:10:18 AM    Originating Location (pt. Location): Home    Distant Location (provider location):  [facility] [place]     Platform used for Video Visit: [employer]          Chief Complaint:   Follow up          History of Present Illness:   Malika Garcia is a 26 year old female with a history of anxiety and bilateral ovarian cysts s/p cyst resection who presents for follow up of dysuria.  Patient was initially seen via virtual visit on 6/17/2022 reporting ongoing dysuria since 5/12/2022.  She was initially treated via virtual MD who prescribed Macrobid.  However, despite antibiotic treatment, the dysuria persisted and she went to an urgent care where she was prescribed Bactrim after it was identified that the Macrobid was resistant.  The patient reports symptoms improved on the Bactrim, but then started recurring before she was done with the antibiotic.  The dysuria again continued to persist and she most recently went to the [facility] ED on 6/4/2022 where urinalysis was normal, UPT negative, and pelvic US was unremarkable.  Patient admits to current symptoms of abdominal pain, and intermittent dysuria, which she relates more to caffeine use.  She denies any hematuria.  She does admit to occasional frequency but states that  she is currently drinking a lot of water to stay hydrated.  She was recommended for urinalysis, urine culture, and ureaplasma and mycoplasma culture to rule out infectious etiology.  Urine culture showing low grade <10,000 CFU/mL Group B Strep, and ureaplasma and mycoplasma culture still in process.  PVR from 7/8/2022 showing complete bladder emptying with PVR 0 ml.      In the interim, the patient reports her abdominal discomfort is a little better and is usually related to movements such as carrying her dog, or laying on her side in bed.  She reports the dysuria is improving at this time and is not as noticeable or persistent as it has been in the past.  No urinary urgency but states occasionally she will have a lower urine output even though she feels she has to void.           Past Medical History:     Past Medical History:   Diagnosis Date     Anxiety      Bilateral ovarian cysts 2015            Past Surgical History:     Past Surgical History:   Procedure Laterality Date     AS REMOVAL OF OVARIAN CYST(S) Left 2015            Medications     Current Outpatient Medications   Medication     adapalene (DIFFERIN) 0.1 % external gel     cyclobenzaprine (FLEXERIL) 5 MG tablet     hydrOXYzine (VISTARIL) 25 MG capsule     nitroFURantoin macrocrystal-monohydrate (MACROBID) 100 MG capsule     PARoxetine (PAXIL) 10 MG tablet     sulfamethoxazole-trimethoprim (BACTRIM DS) 800-160 MG tablet     No current facility-administered medications for this visit.            Family History:     Family History   Problem Relation Age of Onset     Hypertension Mother      Diabetes Maternal Grandfather      No Known Problems Half-Sister      No Known Problems Half-Sister      No Known Problems Half-Sister             Social History:     Social History     Socioeconomic History     Marital status:      Spouse name: Not on file     Number of children: 0     Years of education: Not on file     Highest education level: Not on file    Occupational History     Comment: [employer]- [employer]   Tobacco Use     Smoking status: Never Smoker     Smokeless tobacco: Never Used   Vaping Use     Vaping Use: Never used   Substance and Sexual Activity     Alcohol use: Not Currently     Drug use: Never     Sexual activity: Yes     Partners: Male     Birth control/protection: Implant   Other Topics Concern     Not on file   Social History Narrative     Not on file     Social Determinants of Health     Financial Resource Strain: Not on file   Food Insecurity: Not on file   Transportation Needs: Not on file   Physical Activity: Not on file   Stress: Not on file   Social Connections: Not on file   Intimate Partner Violence: Not on file   Housing Stability: Not on file            Allergies:   Penicillins, Venlafaxine, and Dogs         Review of Systems:  From intake questionnaire   Negative 14 system review except as noted on HPI, nurse's note.         Physical Exam:   Patient is a 26 year old  female    General Appearance Adult: Alert, no acute distress, oriented  Lungs: no respiratory distress, or pursed lip breathing  Heart: No obvious jugular venous distension present  Skin: no suspicious lesions or rashes  Neuro: Alert, oriented, speech and mentation normal  Further examination is deferred due to the nature of our visit.        Labs and Pathology:    I personally reviewed all applicable laboratory data and went over findings with patient  Significant for:    CBC RESULTS:  No results for input(s): WBC, HGB, PLT in the last 84729 hours.     BMP RESULTS:  Recent Labs   Lab Test 06/08/22  1127      POTASSIUM 3.9   CHLORIDE 108   CO2 26   ANIONGAP 7   GLC 91   BUN 12   CR 0.61   GFRESTIMATED >90   ORION 8.7       UA RESULTS:   Recent Labs   Lab Test 07/08/22  0850   SG 1.025   URINEPH 6.0   NITRITE Negative   RBCU <1   WBCU 3         Imaging:    I personally reviewed all applicable imaging and went over findings with patient.  Significant for:    No  results found for this or any previous visit.           Assessment and Plan:     Assessment: 26 year old female with symptoms of lower abdominal pain and intermittent dysuria which has been ongoing for the past month.  By patient report, she was initially seen and tested positive for UTI on 5/12/2022, but treatment with Macrobid did not change symptoms.  The patient was subsequently switched to Bactrim with improvement in symptoms, but then they recurred near the end of treatment and persist at this time.  Recent UA unremarkable from 6/4/2022 and pelvic US normal from that date.  Patient was referred for infectious work up with UA/UC, which was completed 7/8/2022 with urine culture positive for <10,000 CFU/mL Group B Strep.  Difficult to know if this is pathologic infection, as bacteria can be seen within the urogenital tract.  However, given her symptoms will plan to treat; urine culture susceptibilities pending at this time given patient allergy to penicillin, and patient unsure if she has taken cephalosporins in the past.  Ureaplasma and mycoplasma culture pending at this time.  Given mild improvement in symptoms in the interim, will plan for follow up in 4-6 weeks for recheck following antibiotic treatment.  If symptoms persistent despite antibiotic therapy, could consider further evaluation with cystoscopy or UDS for further evaluation.      Plan:  - Await finalized urine culture and ureaplasma/mycoplasma culture.   - Follow up with an ANALY in 4-6 weeks for recheck of symptoms.       CIERRA Romero  Department of Urology

## 2022-07-12 NOTE — PROGRESS NOTES
Malika is a 26 year old who is being evaluated via a billable video visit.      How would you like to obtain your AVS? MyChart  If the video visit is dropped, the invitation should be resent by: Text to cell phone: [phone]  Will anyone else be joining your video visit? No        Video-Visit Details    Video Start Time: 10:09 AM    Type of service:  Video Visit    Video End Time:10:18 AM    Originating Location (pt. Location): Home    Distant Location (provider location):  [facility]     Platform used for Video Visit: [employer]

## 2022-07-13 LAB — BACTERIA UR CULT: ABNORMAL

## 2022-07-15 ENCOUNTER — PATIENT OUTREACH (OUTPATIENT)
Dept: UROLOGY | Facility: CLINIC | Age: 27
End: 2022-07-15

## 2022-07-15 DIAGNOSIS — A49.3 NONGONOCOCCAL URETHRITIS DUE TO UREAPLASMA UREALYTICUM: Primary | ICD-10-CM

## 2022-07-15 DIAGNOSIS — R30.0 DYSURIA: ICD-10-CM

## 2022-07-15 DIAGNOSIS — N34.1 NONGONOCOCCAL URETHRITIS DUE TO UREAPLASMA UREALYTICUM: Primary | ICD-10-CM

## 2022-07-15 RX ORDER — DOXYCYCLINE 100 MG/1
100 CAPSULE ORAL 2 TIMES DAILY
Qty: 14 CAPSULE | Refills: 0 | Status: SHIPPED | OUTPATIENT
Start: 2022-07-15 | End: 2022-07-22

## 2022-07-15 NOTE — TELEPHONE ENCOUNTER
Reached out to inform pt of the need for antibiotics for positive ureaplasma. Provider sent med to pharmacy. Pt expressed understanding. Will continue to follow as needed.

## 2022-07-26 ENCOUNTER — LAB (OUTPATIENT)
Dept: LAB | Facility: CLINIC | Age: 27
End: 2022-07-26
Payer: COMMERCIAL

## 2022-07-26 DIAGNOSIS — R30.0 DYSURIA: ICD-10-CM

## 2022-07-26 LAB
ALBUMIN UR-MCNC: NEGATIVE MG/DL
APPEARANCE UR: CLEAR
BILIRUB UR QL STRIP: NEGATIVE
COLOR UR AUTO: YELLOW
GLUCOSE UR STRIP-MCNC: NEGATIVE MG/DL
HGB UR QL STRIP: ABNORMAL
KETONES UR STRIP-MCNC: NEGATIVE MG/DL
LEUKOCYTE ESTERASE UR QL STRIP: NEGATIVE
NITRATE UR QL: NEGATIVE
PH UR STRIP: 5 [PH] (ref 5–7)
RBC URINE: 1 /HPF
SP GR UR STRIP: 1.01 (ref 1–1.03)
SQUAMOUS EPITHELIAL: 2 /HPF
UROBILINOGEN UR STRIP-MCNC: NORMAL MG/DL
WBC URINE: 1 /HPF

## 2022-07-26 PROCEDURE — 81001 URINALYSIS AUTO W/SCOPE: CPT | Performed by: PATHOLOGY

## 2022-07-26 PROCEDURE — 87086 URINE CULTURE/COLONY COUNT: CPT | Performed by: PHYSICIAN ASSISTANT

## 2022-07-27 LAB — BACTERIA UR CULT: NORMAL

## 2022-08-12 ENCOUNTER — PRE VISIT (OUTPATIENT)
Dept: UROLOGY | Facility: CLINIC | Age: 27
End: 2022-08-12

## 2022-08-16 PROBLEM — H43.819 DEGENERATION OF POSTERIOR VITREOUS BODY: Status: ACTIVE | Noted: 2018-06-01

## 2022-10-03 ENCOUNTER — HEALTH MAINTENANCE LETTER (OUTPATIENT)
Age: 27
End: 2022-10-03

## 2022-10-04 ENCOUNTER — PRE VISIT (OUTPATIENT)
Dept: UROLOGY | Facility: CLINIC | Age: 27
End: 2022-10-04

## 2022-10-04 NOTE — TELEPHONE ENCOUNTER
Reason for visit: follow up      Dx/Hx/Sx: dysuria     Records/imaging/labs/orders: in epic    At Rooming: video visit

## 2023-03-01 ENCOUNTER — OFFICE VISIT (OUTPATIENT)
Dept: FAMILY MEDICINE | Facility: CLINIC | Age: 28
End: 2023-03-01
Payer: COMMERCIAL

## 2023-03-01 VITALS
OXYGEN SATURATION: 95 % | HEART RATE: 86 BPM | WEIGHT: 128.9 LBS | SYSTOLIC BLOOD PRESSURE: 118 MMHG | DIASTOLIC BLOOD PRESSURE: 84 MMHG | TEMPERATURE: 98.1 F | BODY MASS INDEX: 27.06 KG/M2 | HEIGHT: 58 IN

## 2023-03-01 DIAGNOSIS — R30.0 DYSURIA: Primary | ICD-10-CM

## 2023-03-01 DIAGNOSIS — L70.9 ACNE, UNSPECIFIED ACNE TYPE: ICD-10-CM

## 2023-03-01 DIAGNOSIS — N83.202 CYSTS OF BOTH OVARIES: ICD-10-CM

## 2023-03-01 DIAGNOSIS — F41.9 ANXIETY: ICD-10-CM

## 2023-03-01 DIAGNOSIS — N83.201 CYSTS OF BOTH OVARIES: ICD-10-CM

## 2023-03-01 DIAGNOSIS — H04.123 CHRONIC DRYNESS OF BOTH EYES: ICD-10-CM

## 2023-03-01 PROCEDURE — 86235 NUCLEAR ANTIGEN ANTIBODY: CPT | Performed by: NURSE PRACTITIONER

## 2023-03-01 PROCEDURE — 86431 RHEUMATOID FACTOR QUANT: CPT | Performed by: NURSE PRACTITIONER

## 2023-03-01 RX ORDER — PAROXETINE 10 MG/1
10 TABLET, FILM COATED ORAL EVERY MORNING
Qty: 90 TABLET | Refills: 3 | Status: SHIPPED | OUTPATIENT
Start: 2023-03-01 | End: 2024-02-28

## 2023-03-01 RX ORDER — ADAPALENE 45 G/G
GEL TOPICAL EVERY 24 HOURS
Qty: 45 G | Refills: 1 | Status: SHIPPED | OUTPATIENT
Start: 2023-03-01

## 2023-03-01 RX ORDER — HYDROXYZINE PAMOATE 25 MG/1
25 CAPSULE ORAL 3 TIMES DAILY PRN
Qty: 30 CAPSULE | Refills: 3 | Status: SHIPPED | OUTPATIENT
Start: 2023-03-01 | End: 2024-02-28

## 2023-03-01 NOTE — PATIENT INSTRUCTIONS
Please make a follow-up appointment with my colleague, Caroline Higgins CNP to discuss nexplanon.

## 2023-03-01 NOTE — NURSING NOTE
"ROOM:1    Preferred Name: Malika     27 year old  Chief Complaint   Patient presents with     Referral     Urologist, Ophthalmologist, labs     Refill Request       Blood pressure 118/84, pulse 86, temperature 98.1  F (36.7  C), temperature source Oral, height 1.463 m (4' 9.6\"), weight 58.5 kg (128 lb 14.4 oz), SpO2 95 %. Body mass index is 27.32 kg/m .      Patient Active Problem List   Diagnosis     Cyst of ovary     Degeneration of posterior vitreous body     Panic disorder       Wt Readings from Last 2 Encounters:   03/01/23 58.5 kg (128 lb 14.4 oz)   06/08/22 59.2 kg (130 lb 9.6 oz)     BP Readings from Last 3 Encounters:   03/01/23 118/84   06/08/22 117/85       Allergies   Allergen Reactions     Penicillins Anaphylaxis and Hives     Hives, itching, bruising      Venlafaxine Itching     Redness and itching     Dogs Other (See Comments)     Nasal congestion       Current Outpatient Medications   Medication     adapalene (DIFFERIN) 0.1 % external gel     cyclobenzaprine (FLEXERIL) 5 MG tablet     hydrOXYzine (VISTARIL) 25 MG capsule     PARoxetine (PAXIL) 10 MG tablet     No current facility-administered medications for this visit.       Social History     Tobacco Use     Smoking status: Never     Smokeless tobacco: Never   Vaping Use     Vaping Use: Never used   Substance Use Topics     Alcohol use: Not Currently     Drug use: Never       Honoring Choices - Health Care Directive Guide offered to patient at time of visit.    Health Maintenance Due   Topic Date Due     ADVANCE CARE PLANNING  Never done     HEPATITIS B IMMUNIZATION (1 of 3 - 3-dose series) Never done     COVID-19 Vaccine (1) Never done     HIV SCREENING  Never done     HEPATITIS C SCREENING  Never done     PAP  Never done     INFLUENZA VACCINE (1) Never done       Immunization History   Administered Date(s) Administered     DTaP, Unspecified 06/01/2019       No results found for: PAP      Recent Labs   Lab Test 06/08/22  1127   LDL 95   HDL 40* "   TRIG 213*   ALT 45   CR 0.61   GFRESTIMATED >90   ALBUMIN 3.7   POTASSIUM 3.9   TSH 1.54       PHQ-2 ( 1999 Pfizer) 3/1/2023 2/28/2023   Q1: Little interest or pleasure in doing things 0 0   Q2: Feeling down, depressed or hopeless 0 0   PHQ-2 Score 0 0   Q1: Little interest or pleasure in doing things - Not at all   Q2: Feeling down, depressed or hopeless - Not at all   PHQ-2 Score - 0       No flowsheet data found.    No flowsheet data found.    No flowsheet data found.      Remedios Ruiz, [facility]  March 1, 2023 8:04 AM

## 2023-03-01 NOTE — PROGRESS NOTES
"Today's Date: Mar 1, 2023     Patient Malika Garcia 1995 presents to the clinic today to address No chief complaint on file.            SUBJECTIVE     History of Present Illness:    27 year old female with PMH anxiety,bilateral ovarian cysts s/p cyst resection, and dysuria presents for medication refills and referrals.     Bilateral eye dryness/burning- Patient reports chronic (\"years\") bilateral eye dryness/burning. 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 She denies change in visual acuity, black fixed spots, curtains. She would like a referral for opthlamology. She has never been worked up for Sjogrens in the past.     Dysuria- S/p ureaplasma infection treatment with doxycycline in July of 2022. She reports that the dysuria has returned and would like to go back to Urology. No other acute concerns/symptoms at time of exam.    Review of Systems   Constitutional, HEENT, cardiovascular, pulmonary, gi and gu systems are negative, except as otherwise noted.      Allergies   Allergen Reactions     Penicillins Anaphylaxis and Hives     Hives, itching, bruising      Venlafaxine Itching     Redness and itching     Dogs Other (See Comments)     Nasal congestion        Current Outpatient Medications   Medication Instructions     adapalene (DIFFERIN) 0.1 % external gel Topical, EVERY 24 HOURS     cyclobenzaprine (FLEXERIL) 5 MG tablet No dose, route, or frequency recorded.     hydrOXYzine (VISTARIL) 25 mg, Oral, 3 TIMES DAILY PRN     PARoxetine (PAXIL) 10 mg, Oral, EVERY MORNING, Every morning       Past Medical History:   Diagnosis Date     Anxiety      Bilateral ovarian cysts 2015        Family History   Problem Relation Age of Onset     Hypertension Mother      Diabetes Maternal Grandfather      No Known Problems Half-Sister      No Known Problems Half-Sister      No Known Problems Half-Sister         Social History " "    Tobacco Use     Smoking status: Never     Smokeless tobacco: Never   Vaping Use     Vaping Use: Never used   Substance Use Topics     Alcohol use: Not Currently     Drug use: Never        History   Sexual Activity     Sexual activity: Yes     Partners: Male     Birth control/ protection: Implant        No flowsheet data found.     Immunization History   Administered Date(s) Administered     DTaP, Unspecified 06/01/2019                 OBJECTIVE     /84   Pulse 86   Temp 98.1  F (36.7  C) (Oral)   Ht 1.463 m (4' 9.6\")   Wt 58.5 kg (128 lb 14.4 oz)   SpO2 95%   BMI 27.32 kg/m        Labs:  Lab Results   Component Value Date    CHOL 178 06/08/2022    TRIG 213 (H) 06/08/2022    HDL 40 (L) 06/08/2022    ALT 45 06/08/2022    AST 21 06/08/2022     06/08/2022    BUN 12 06/08/2022    CO2 26 06/08/2022    TSH 1.54 06/08/2022        Physical Exam  Constitutional:       General: She is not in acute distress.     Appearance: She is not ill-appearing.   Eyes:      General:         Right eye: No discharge.         Left eye: No discharge.      Extraocular Movements: Extraocular movements intact.      Pupils: Pupils are equal, round, and reactive to light.   Cardiovascular:      Rate and Rhythm: Normal rate and regular rhythm.      Heart sounds: Normal heart sounds. No murmur heard.  Pulmonary:      Effort: Pulmonary effort is normal. No respiratory distress.      Breath sounds: Normal breath sounds. No wheezing, rhonchi or rales.   Musculoskeletal:      Cervical back: Neck supple.   Lymphadenopathy:      Cervical: No cervical adenopathy.   Neurological:      General: No focal deficit present.      Mental Status: She is alert.   Psychiatric:         Thought Content: Thought content normal.         Judgment: Judgment normal.               ASSESSMENT/PLAN     1. Dysuria  Repeat UA today. Advised patient to call Urology to schedule follow-up. We can always place a referral if need be.   - UA reflex to Microscopic " and Culture    2. Chronic dryness of both eyes  Check Sjogren pane and referral placed to Opth. Advised patient to seek medical attention for any eye discomfort not relieved by drops, acute curtains, decreased visual acuity, etc.  - Adult Eye  Referral; Future  - Rheumatoid factor  - SSB La WARREN Antibody IgG  - SSA Ro WARREN Antibody IgG    3. Anxiety  Refills per patient request.  - PARoxetine (PAXIL) 10 MG tablet; Take 1 tablet (10 mg) by mouth every morning Every morning  Dispense: 90 tablet; Refill: 3  - hydrOXYzine (VISTARIL) 25 MG capsule; Take 1 capsule (25 mg) by mouth 3 times daily as needed for itching  Dispense: 30 capsule; Refill: 3    4. Acne, unspecified acne type  Refills per patient request.  - adapalene (DIFFERIN) 0.1 % external gel; Apply topically every 24 hours  Dispense: 45 g; Refill: 1    5. Cysts of both ovaries  Pt would like to establish with OBGYN to discuss ovarian cysts and Nexplanon.  - Ob/Gyn Referral      Follow-Up:  - Follow up in as needed, or if symptoms worsen or fail to improve. Disposition pending results.      Options for treatment and follow-up care were reviewed with the patient. Patient engaged in the decision making process and verbalized understanding of the options discussed and agreed with the final plan.  AVS printed and given to patient.    NASIM Zaidi [facility]  [facility]  [address] [address]  [phone]

## 2023-03-02 LAB
ENA SS-A AB SER IA-ACNC: <0.5 U/ML
ENA SS-A AB SER IA-ACNC: NEGATIVE
ENA SS-B IGG SER IA-ACNC: <0.6 U/ML
ENA SS-B IGG SER IA-ACNC: NEGATIVE
RHEUMATOID FACT SER NEPH-ACNC: <7 IU/ML

## 2023-06-07 NOTE — TELEPHONE ENCOUNTER
FUTURE VISIT INFORMATION      FUTURE VISIT INFORMATION:    Date: 7/25/23    Time: 12:20pm    Location: [facility]  REFERRAL INFORMATION:    Referring provider:  Victor Hugo Watkins    Referring providers clinic:      Reason for visit/diagnosis  Chronic dryness of both eyes [H04.123],vitreous degeneration    RECORDS REQUESTED FROM:       Clinic name Comments Records Status Imaging Status   [facility] Request for recs sent 6/7 [phone], resent 6/21/23, 7/12/23     [facility] OV/referral 3/1/23

## 2023-07-25 ENCOUNTER — PRE VISIT (OUTPATIENT)
Dept: OPHTHALMOLOGY | Facility: CLINIC | Age: 28
End: 2023-07-25

## 2023-07-25 ENCOUNTER — OFFICE VISIT (OUTPATIENT)
Dept: OPHTHALMOLOGY | Facility: CLINIC | Age: 28
End: 2023-07-25
Payer: COMMERCIAL

## 2023-07-25 DIAGNOSIS — H04.123 CHRONIC DRYNESS OF BOTH EYES: ICD-10-CM

## 2023-07-25 DIAGNOSIS — H43.393 VITREOUS FLOATER, BILATERAL: ICD-10-CM

## 2023-07-25 DIAGNOSIS — Z83.511 FAMILY HISTORY OF GLAUCOMA: ICD-10-CM

## 2023-07-25 DIAGNOSIS — H04.123 DRY EYES, BILATERAL: Primary | ICD-10-CM

## 2023-07-25 PROCEDURE — 92004 COMPRE OPH EXAM NEW PT 1/>: CPT | Performed by: OPHTHALMOLOGY

## 2023-07-25 PROCEDURE — 92020 GONIOSCOPY: CPT | Mod: 59 | Performed by: OPHTHALMOLOGY

## 2023-07-25 ASSESSMENT — EXTERNAL EXAM - RIGHT EYE: OD_EXAM: NORMAL

## 2023-07-25 ASSESSMENT — VISUAL ACUITY
OD_SC: 20/20
METHOD: SNELLEN - LINEAR
OS_SC: 20/20

## 2023-07-25 ASSESSMENT — CONF VISUAL FIELD
METHOD: COUNTING FINGERS
OD_INFERIOR_NASAL_RESTRICTION: 0
OD_SUPERIOR_NASAL_RESTRICTION: 0
OD_SUPERIOR_TEMPORAL_RESTRICTION: 0
OS_NORMAL: 1
OS_INFERIOR_TEMPORAL_RESTRICTION: 0
OD_INFERIOR_TEMPORAL_RESTRICTION: 0
OD_NORMAL: 1
OS_SUPERIOR_NASAL_RESTRICTION: 0
OS_INFERIOR_NASAL_RESTRICTION: 0
OS_SUPERIOR_TEMPORAL_RESTRICTION: 0

## 2023-07-25 ASSESSMENT — GONIOSCOPY
OD_SUPERIOR: PTM
OD_INFERIOR: PTM
OD_TEMPORAL: ATM
OS_NASAL: ATM
OD_NASAL: ATM
METHOD: ZEISS, FOUR MIRROR
OS_TEMPORAL: ATM
OS_INFERIOR: PTM
OS_SUPERIOR: PTM

## 2023-07-25 ASSESSMENT — SLIT LAMP EXAM - LIDS
COMMENTS: NORMAL
COMMENTS: NORMAL

## 2023-07-25 ASSESSMENT — CUP TO DISC RATIO
OD_RATIO: 0.2
OS_RATIO: 0.2

## 2023-07-25 ASSESSMENT — EXTERNAL EXAM - LEFT EYE: OS_EXAM: NORMAL

## 2023-07-25 ASSESSMENT — TONOMETRY
OS_IOP_MMHG: 13
OD_IOP_MMHG: 12
IOP_METHOD: ICARE

## 2023-07-25 NOTE — PROGRESS NOTES
CC:   Chief Complaints and History of Present Illnesses   Patient presents with    Consult For     Pt here for dry eyes and vitreous degeneration.       HPI  Malika Garcia is a 27 year old female here for above.    HPI       Consult For    In both eyes.  Associated symptoms include eye pain and floaters. Additional comments: Pt here for dry eyes and vitreous degeneration.             Comments    Pt last eye exam was years ago. Pt notes stationary floaters. Pt are dry and burn- no formal dx. Does not wear contacts. Pt has no hx of autoimmune (no family hx either). Maternal grandmother has glaucoma.     Pt using:  Refresh multiple times per day.   PRAMOD Galindo on 7/25/2023 at 12:24 PM            Last edited by Irina Rangel COA on 7/25/2023 12:24 PM.           PMH:    Past Medical History:   Diagnosis Date    Anxiety     Bilateral ovarian cysts 2015     POH:  none, no eye surgery, no eye trauma  Oc Meds:  artificial tear drops several times per day  FH:  maternal grandmother with glaucoma, no age related macular degeneration, or other known eye diseases       Assessment & Plan       1. Dry eyes, bilateral - Both Eyes    2. Chronic dryness of both eyes - Both Eyes    3. Vitreous floater, bilateral - Both Eyes    4. Family history of glaucoma - Both Eyes      (H04.123) Dry eyes, bilateral - Both Eyes  Comment: moderate with cornea signs  Plan:  continue artificial tear drops   Discussed with patient risks, benefits, and alternatives to starting a long term prescription drop  She would like to start xiidra.  Will follow-up in 6-8 weeks.     (H43.393) Vitreous floater, bilateral - Both Eyes  Comment: stable  No vitreous hemorrhage/pigment, retinal tears, or detachment seen on exam today.  Plan:   posterior vitreous detachment as well as retinal tear/detachment symptoms discussed with patient.  Told to call for prompt dilated exam if these symptoms occur.     (Z83.511) Family history of glaucoma - Both  Eyes  Comment: healthy optic discs, normal intraocular pressure and open angles today on exam  Plan: gonioscopy  Reassured patient she is low risk for glaucoma at this time.    Patient disposition:   Return in 6 weeks (on 9/5/2023) for follow up- dry eye. Call for sooner appointment as needed.      Complete documentation of historical and exam elements from today's encounter can be found in the full encounter summary report (not reduplicated in this progress note). I personally obtained the chief complaint(s) and history of present illness.  I have confirmed and edited as necessary the CC, HPI, PMH/PSH, social history, FMH, ROS, and exam/neuro findings as obtained by the technician or others. I have examined this patient myself and I personally viewed the image(s) and studies listed above and the documentation reflects my findings and interpretation.  I formulated and edited as necessary the assessment and plan and discussed the findings and management plan with the patient and family.     Leena Valiente MD

## 2023-07-25 NOTE — NURSING NOTE
Chief Complaints and History of Present Illnesses   Patient presents with    Consult For     Pt here for dry eyes and vitreous degeneration.     Chief Complaint(s) and History of Present Illness(es)       Consult For              Laterality: both eyes    Associated symptoms: eye pain and floaters    Comments: Pt here for dry eyes and vitreous degeneration.              Comments    Pt last eye exam was years ago. Pt notes stationary floaters. Pt are dry and burn- no formal dx. Does not wear contacts. Pt has no hx of autoimmune (no family hx either). Maternal grandmother has glaucoma.     Pt using:  Refresh multiple times per day.   PRAMOD Galindo on 7/25/2023 at 12:24 PM

## 2023-08-02 ENCOUNTER — OFFICE VISIT (OUTPATIENT)
Dept: FAMILY MEDICINE | Facility: CLINIC | Age: 28
End: 2023-08-02
Payer: COMMERCIAL

## 2023-08-02 VITALS
HEART RATE: 76 BPM | RESPIRATION RATE: 17 BRPM | OXYGEN SATURATION: 95 % | WEIGHT: 121 LBS | TEMPERATURE: 98.3 F | HEIGHT: 61 IN | DIASTOLIC BLOOD PRESSURE: 74 MMHG | BODY MASS INDEX: 22.84 KG/M2 | SYSTOLIC BLOOD PRESSURE: 104 MMHG

## 2023-08-02 DIAGNOSIS — J32.0 CHRONIC MAXILLARY SINUSITIS: ICD-10-CM

## 2023-08-02 DIAGNOSIS — J30.89 NON-SEASONAL ALLERGIC RHINITIS, UNSPECIFIED TRIGGER: Primary | ICD-10-CM

## 2023-08-02 RX ORDER — LORATADINE 10 MG/1
10 TABLET ORAL DAILY
COMMUNITY
End: 2024-02-28

## 2023-08-02 RX ORDER — FLUTICASONE PROPIONATE 50 MCG
2 SPRAY, SUSPENSION (ML) NASAL DAILY
Qty: 11.1 ML | Refills: 0 | Status: SHIPPED | OUTPATIENT
Start: 2023-08-02 | End: 2024-02-28

## 2023-08-02 ASSESSMENT — PAIN SCALES - GENERAL: PAINLEVEL: NO PAIN (0)

## 2023-08-02 NOTE — NURSING NOTE
"ROOM:  [facility]    Preferred Name: Malika     How did you hear about us?  Current Patient    27 year old  Chief Complaint   Patient presents with     Allergies     Allergies and referral       Blood pressure 104/74, pulse 76, temperature 98.3  F (36.8  C), temperature source Oral, resp. rate 17, height 1.544 m (5' 0.8\"), weight 54.9 kg (121 lb), SpO2 95 %. Body mass index is 23.01 kg/m .  BP completed using cuff size:        Patient Active Problem List   Diagnosis     Cyst of ovary     Degeneration of posterior vitreous body     Panic disorder       Wt Readings from Last 2 Encounters:   08/02/23 54.9 kg (121 lb)   03/01/23 58.5 kg (128 lb 14.4 oz)     BP Readings from Last 3 Encounters:   08/02/23 104/74   03/01/23 118/84   06/08/22 117/85       Allergies   Allergen Reactions     Penicillins Anaphylaxis and Hives     Hives, itching, bruising      Venlafaxine Itching     Redness and itching     Dogs Other (See Comments)     Nasal congestion       Current Outpatient Medications   Medication     adapalene (DIFFERIN) 0.1 % external gel     cyclobenzaprine (FLEXERIL) 5 MG tablet     hydrOXYzine (VISTARIL) 25 MG capsule     PARoxetine (PAXIL) 10 MG tablet     lifitegrast (XIIDRA) 5 % opthalmic solution     No current facility-administered medications for this visit.       Social History     Tobacco Use     Smoking status: Never     Smokeless tobacco: Never   Vaping Use     Vaping Use: Never used   Substance Use Topics     Alcohol use: Not Currently     Drug use: Never       Honoring Choices - Health Care Directive Guide offered to patient at time of visit.    Health Maintenance Due   Topic Date Due     ADVANCE CARE PLANNING  Never done     HEPATITIS B IMMUNIZATION (1 of 3 - 3-dose series) Never done     COVID-19 Vaccine (1) Never done     HIV SCREENING  Never done     HEPATITIS C SCREENING  Never done     PAP  Never done     YEARLY PREVENTIVE VISIT  06/08/2023       Immunization History   Administered Date(s) " Administered     DTaP, Unspecified 06/01/2019       No results found for: PAP    Recent Labs   Lab Test 06/08/22  1127   LDL 95   HDL 40*   TRIG 213*   ALT 45   CR 0.61   GFRESTIMATED >90   ALBUMIN 3.7   POTASSIUM 3.9   TSH 1.54           8/2/2023     9:04 AM 3/1/2023     8:00 AM   PHQ-2 ( 1999 Pf[employer])   Q1: Little interest or pleasure in doing things 0 0   Q2: Feeling down, depressed or hopeless 0 0   PHQ-2 Score 0 0            No data to display                     No data to display                     No data to display                Hazel Buchanan, EMT    August 2, 2023 9:07 AM

## 2023-08-02 NOTE — PROGRESS NOTES
Today's Date: Aug 2, 2023     Patient Malika Garcia 1995 presents to the clinic today to address   Chief Complaint   Patient presents with     Allergies     Allergies and referral             SUBJECTIVE     History of Present Illness:    27 year old female with PMH anxiety,bilateral ovarian cysts s/p cyst resection Presents to discuss allergies.  Patient reports that she has suffered from allergies since moving to [place].  These allergies are nonseasonal and tend to worsen when she enters her house.  She is requesting to see a specialist for allergy testing.  She currently takes over-the-counter loratadine which works, however she does not take it daily as she does not like to take medication daily for this.  She has not tried Flonase in the past.  She also reports bilateral maxillary/nose pain.  She denies any trauma to her nose.  This has been ongoing for years.  She reports that she went to an urgent care in [place] and took some medication which resolved her symptoms temporarily (but she is unsure of which medication she took).  She denies any systemic symptoms such as fevers or chills.  No other acute concerns or symptoms at time of exam.    Review of Systems   Constitutional, HEENT, cardiovascular, pulmonary, gi and gu systems are negative, except as otherwise noted.      Allergies   Allergen Reactions     Penicillins Anaphylaxis and Hives     Hives, itching, bruising      Venlafaxine Itching     Redness and itching     Dogs Other (See Comments)     Nasal congestion        Current Outpatient Medications   Medication     adapalene (DIFFERIN) 0.1 % external gel     cyclobenzaprine (FLEXERIL) 5 MG tablet     hydrOXYzine (VISTARIL) 25 MG capsule     loratadine (CLARITIN) 10 MG tablet     PARoxetine (PAXIL) 10 MG tablet     lifitegrast (XIIDRA) 5 % opthalmic solution     No current facility-administered medications for this visit.         Past Medical History:   Diagnosis Date     Anxiety      Bilateral  "ovarian cysts 2015        Family History   Problem Relation Age of Onset     Hypertension Mother      Diabetes Maternal Grandfather      No Known Problems Half-Sister      No Known Problems Half-Sister      No Known Problems Half-Sister         Social History     Tobacco Use     Smoking status: Never     Smokeless tobacco: Never   Vaping Use     Vaping Use: Never used   Substance Use Topics     Alcohol use: Not Currently     Drug use: Never        History   Sexual Activity     Sexual activity: Yes     Partners: Male     Birth control/ protection: Implant             No data to display                 Immunization History   Administered Date(s) Administered     DTaP, Unspecified 06/01/2019                 OBJECTIVE     /74 (BP Location: Right arm, Patient Position: Sitting, Cuff Size: Adult Regular)   Pulse 76   Temp 98.3  F (36.8  C) (Oral)   Resp 17   Ht 1.544 m (5' 0.8\")   Wt 54.9 kg (121 lb)   SpO2 95%   BMI 23.01 kg/m       Labs:  Lab Results   Component Value Date    CHOL 178 06/08/2022    TRIG 213 (H) 06/08/2022    HDL 40 (L) 06/08/2022    ALT 45 06/08/2022    AST 21 06/08/2022     06/08/2022    BUN 12 06/08/2022    CO2 26 06/08/2022    TSH 1.54 06/08/2022        Physical Exam  Constitutional:       General: She is not in acute distress.     Appearance: She is not ill-appearing.   HENT:      Right Ear: Tympanic membrane normal.      Left Ear: Tympanic membrane normal.      Nose: Congestion present. No rhinorrhea.      Right Turbinates: Enlarged.      Left Turbinates: Enlarged.      Right Sinus: No maxillary sinus tenderness.      Left Sinus: No maxillary sinus tenderness.      Mouth/Throat:      Mouth: Mucous membranes are moist.      Pharynx: No oropharyngeal exudate or posterior oropharyngeal erythema.   Eyes:      Extraocular Movements: Extraocular movements intact.      Pupils: Pupils are equal, round, and reactive to light.   Cardiovascular:      Rate and Rhythm: Normal rate and regular " rhythm.      Heart sounds: Normal heart sounds. No murmur heard.  Pulmonary:      Effort: Pulmonary effort is normal. No respiratory distress.      Breath sounds: Normal breath sounds. No wheezing or rales.   Musculoskeletal:      Cervical back: Neck supple.   Lymphadenopathy:      Cervical: No cervical adenopathy.   Neurological:      General: No focal deficit present.      Mental Status: She is alert.   Psychiatric:         Thought Content: Thought content normal.         Judgment: Judgment normal.               ASSESSMENT/PLAN     1. Non-seasonal allergic rhinitis, unspecified trigger  We will start Flonase daily and referral placed to allergist per patient request.  Advised to continue loratadine.  - fluticasone (FLONASE) 50 MCG/ACT nasal spray; Spray 2 sprays into both nostrils daily  Dispense: 11.1 mL; Refill: 0  - Adult Allergy/Asthma Referral; Future    2. Chronic maxillary sinusitis  Suspect symptoms are chronic sinusitis.  The absence of acute trauma, will refer to ENT per patient request and see how Flonase affects her symptoms.  - Adult ENT  Referral; Future          Follow-Up:  - Follow up in as needed, or if symptoms worsen or fail to improve.     Options for treatment and follow-up care were reviewed with the patient. Patient engaged in the decision making process and verbalized understanding of the options discussed and agreed with the final plan.  AVS printed and given to patient.    NASIM Zaidi [facility]  [facility]  [address] [place] [address] [id number] [phone]        Note: Chart documentation was done in part with Dragon Voice Recognition software.  Although reviewed after completion, some word and grammatical errors may remain. Please contact author for any clarification or concerns.

## 2023-08-13 ENCOUNTER — HEALTH MAINTENANCE LETTER (OUTPATIENT)
Age: 28
End: 2023-08-13

## 2023-08-19 NOTE — TELEPHONE ENCOUNTER
FUTURE VISIT INFORMATION      FUTURE VISIT INFORMATION:  Date: 11.21.23  Time: 2:00  [place] [facility] [place]  REFERRAL INFORMATION:  Referring provider:  Roland  Referring providers clinic:  [facility]  Reason for visit/diagnosis  Allergici rhinitis, worse when entering home, wants to have testing completed- Non-seasonal allergic rhinitis, unspecified trigger  per pt, Recs in Epic, referred by:   Victor Hugo Watkins APRN CNP in [facility]      RECORDS REQUESTED FROM:       Clinic name Comments Records Status Imaging Status   [facility] 8.2.23, 3.1.23  [facility]

## 2023-10-25 NOTE — TELEPHONE ENCOUNTER
FUTURE VISIT INFORMATION      FUTURE VISIT INFORMATION:  Date: 12/13/23  Time: 11am  Location: [facility]  REFERRAL INFORMATION:  Referring provider:  Victor Hugo Watkins APRN CNP   Referring providers clinic:  [facility]  Reason for visit/diagnosis  per patient sinus issues ongoing. confirmed [facility]     RECORDS REQUESTED FROM:       Clinic name Comments Records Status Imaging Status   He 8/2/23- ov Victor Hugo Watkins APRN CNP  [facility]

## 2023-11-15 ENCOUNTER — OFFICE VISIT (OUTPATIENT)
Dept: OBGYN | Facility: CLINIC | Age: 28
End: 2023-11-15
Attending: MIDWIFE
Payer: COMMERCIAL

## 2023-11-15 VITALS
HEIGHT: 61 IN | BODY MASS INDEX: 22.09 KG/M2 | WEIGHT: 117 LBS | DIASTOLIC BLOOD PRESSURE: 80 MMHG | HEART RATE: 84 BPM | SYSTOLIC BLOOD PRESSURE: 115 MMHG

## 2023-11-15 DIAGNOSIS — Z87.42 HISTORY OF OVARIAN CYST: ICD-10-CM

## 2023-11-15 DIAGNOSIS — Z30.46 ENCOUNTER FOR REMOVAL AND REINSERTION OF NEXPLANON: Primary | ICD-10-CM

## 2023-11-15 DIAGNOSIS — Z90.721 HISTORY OF OOPHORECTOMY, UNILATERAL: ICD-10-CM

## 2023-11-15 DIAGNOSIS — Z97.5 NEXPLANON IN PLACE: ICD-10-CM

## 2023-11-15 PROCEDURE — 11983 REMOVE/INSERT DRUG IMPLANT: CPT | Performed by: MIDWIFE

## 2023-11-15 PROCEDURE — 250N000011 HC RX IP 250 OP 636: Performed by: STUDENT IN AN ORGANIZED HEALTH CARE EDUCATION/TRAINING PROGRAM

## 2023-11-15 RX ADMIN — ETONOGESTREL 68 MG: 68 IMPLANT SUBCUTANEOUS at 12:37

## 2023-11-15 NOTE — PATIENT INSTRUCTIONS
Thank you for trusting us with your care!     If you need to contact us for questions about:  Symptoms, Scheduling & Medical Questions; Non-urgent (2-3 day response) Monaesalima message, Urgent (needing response today) [phone] (if after 3:30pm next day response)   Prescriptions: Please call your Pharmacy   Billing: [facility] [phone] or [facility]:[phone]      NEXPLANON AFTERCARE INSTRUCTIONS   Keep dressing on and dry for 24 hours, then remove wrap.  Replace bandaid daily for 5 days. Keep your user card in a safe place where you'll remember it.    You may have some pain at the site of the Nexplanon insertion. You can help relieve the discomfort with Tylenol (acetaminophen), Aspirin or Advil (ibuprofen). If your discomfort worsens or you notice redness spreading on the skin around the insertion site, please call the clinic.     Irregular bleeding is common with Nexplanon, especially in the first 6-12 months of use. After one year, approximately 20% of women who use Nexplanon will stop having periods completely. Some women have longer, heavier periods. Some women will have increased spotting between periods. You may find that your periods may be hard to predict.     The Nexplanon does not protect against sexually transmitted infections including the AIDS virus (HIV), warts (HPV), gonorrhea, Chlamydia, and herpes. Condoms should be used to decrease the risk sexually transmitted infections. If you think that you have been exposed to a sexually transmitted infection, please call the clinic.     If you had Nexplanon placed for birth control, it is effective immediately if it was inserted within five days after the start of your period. If you have Nexplanon inserted at any other time during your menstrual cycle, use another method of birth control, like condoms for at least 7 days.     The Nexplanon should be removed and/or replaced by a health care provider after three years.   Warning Signs   Call the clinic  if any of the following occurs:     You have bleeding, pus, or increasing redness, or pain at insertion site.     You have fever or chills     The implant comes out or you have concerns about its location.     You have a positive pregnancy test or suspect you might be pregnant.

## 2023-11-15 NOTE — PROGRESS NOTES
No symptoms. History of L oophorectomy due to large symptomatic cyst. History of ovarian cysts. Requesting ultrasound for follow up, although not having symptoms.     No periods/bleeding.

## 2023-11-15 NOTE — NURSING NOTE
Chief Complaint   Patient presents with    Minor Procedure     Nexplanon removal and reinsertion

## 2023-11-15 NOTE — LETTER
"11/15/2023       RE: Malika Garcia  [address]  [facility] [address]     Dear Colleague,    Thank you for referring your patient, Malika Garcia, to the [facility] [place] at [facility]. Please see a copy of my visit note below.    NEXPLANON REMOVAL/REINSERTION:    History of L oophorectomy due to large symptomatic cyst. History of ovarian cysts. Requesting ultrasound for follow up, although not having symptoms.     Is a pregnancy test required: No.  Was a consent obtained?  Yes    Subjective: Malika Garcia is a 28 year old No obstetric history on file. presents for Nexplanon.    Patient has been given the opportunity to ask questions about all forms of birth control, including all options appropriate for Malika Garcia. Discussed that no method of birth control, except abstinence is 100% effective against pregnancy or sexually transmitted infection.     Malika Garcia understands planning removal and reinsertion today    The entire removal and insertion procedure was reviewed with the patient, including care after placement.      /80   Pulse 84   Ht 1.549 m (5' 1\")   Wt 53.1 kg (117 lb)   BMI 22.11 kg/m      PROCEDURE NOTE: -- Nexplanon Removal/Insertion    Technique: On the left arm  Skin prep Betadine  Anesthesia 1% lidocaine, without epi  Procedure: Patient was placed supine with left arm exposed.  Implant located by palpitation. Roxi was made 8-10 cm above medial epicondyle and a guiding roxi 4 cm above the first.  Arm was prepped with Betadine. Incision point was anesthetized with 2.5 mL 1% lidocaine.     Small incision (<5mm) was made at distal end of palpable implant, curved hemostat or mosquito forceps was used to isolate the implant and bring it to the incision, the fibrous capsule containing the implant  was incised and the implant was removed intact.    After stretching the skin with thumb and index finger around the " "insertion site, skin punctured with the tip of the needle inserted at 30 degrees and then lowered to horizontal position. While lifting the skin with the tip of the needle, inserted the needle to its full length. Applicator was then stabilized and the slider was unlocked by pushing it slightly down. Slider moved back completely until it stopped. Applicator was then removed.    Correct placement of the implant was confirmed by palpation in the patient's arm and visualizing the purple top of the obturator.   Bandage and pressure dressing applied to insertion site.    Lot # [id number]  Exp: 09/2025    EBL: minimal    Complications: none    ASSESSMENT:     ICD-10-CM    1. History of ovarian cyst  Z87.42 US Pelvic Transabdominal and Transvaginal      2. History of oophorectomy, unilateral  Z90.721 US Pelvic Transabdominal and Transvaginal             PLAN:    Given 's handouts, including when to have Nexplanon removed, list of danger s/sx, side effects and follow up recommended. Encouraged condom use for prevention of STD. Advised to call for any fever, for prolonged or severe pain or bleeding, abnormal vaginal dischage. She was advised to use pain medications (ibuprofen) as needed for mild to moderate pain.     \"I, Amber Forrest MD , am serving as a scribe to document services personally performed by CNM based on the provider's statements to me.\" - Amber Forrest MD   The encounter was performed by me and scribed by the resident. The scribed note accurately reflects my personal services and decisions made by me.     NASIM Hardy CNM    "

## 2023-11-15 NOTE — PROGRESS NOTES
"NEXPLANON REMOVAL/REINSERTION:    History of L oophorectomy due to large symptomatic cyst. History of ovarian cysts. Requesting ultrasound for follow up, although not having symptoms.     Is a pregnancy test required: No.  Was a consent obtained?  Yes    Subjective: Malika Garcia is a 28 year old No obstetric history on file. presents for Nexplanon.    Patient has been given the opportunity to ask questions about all forms of birth control, including all options appropriate for Malika Garcia. Discussed that no method of birth control, except abstinence is 100% effective against pregnancy or sexually transmitted infection.     Malika Garcia understands planning removal and reinsertion today    The entire removal and insertion procedure was reviewed with the patient, including care after placement.      /80   Pulse 84   Ht 1.549 m (5' 1\")   Wt 53.1 kg (117 lb)   BMI 22.11 kg/m      PROCEDURE NOTE: -- Nexplanon Removal/Insertion    Technique: On the left arm  Skin prep Betadine  Anesthesia 1% lidocaine, without epi  Procedure: Patient was placed supine with left arm exposed.  Implant located by palpitation. Roxi was made 8-10 cm above medial epicondyle and a guiding roxi 4 cm above the first.  Arm was prepped with Betadine. Incision point was anesthetized with 2.5 mL 1% lidocaine.     Small incision (<5mm) was made at distal end of palpable implant, curved hemostat or mosquito forceps was used to isolate the implant and bring it to the incision, the fibrous capsule containing the implant  was incised and the implant was removed intact.    After stretching the skin with thumb and index finger around the insertion site, skin punctured with the tip of the needle inserted at 30 degrees and then lowered to horizontal position. While lifting the skin with the tip of the needle, inserted the needle to its full length. Applicator was then stabilized and the slider was unlocked by pushing it slightly down. Slider moved back " "completely until it stopped. Applicator was then removed.    Correct placement of the implant was confirmed by palpation in the patient's arm and visualizing the purple top of the obturator.   Bandage and pressure dressing applied to insertion site.    Lot # [id number]  Exp: 09/2025    EBL: minimal    Complications: none    ASSESSMENT:     ICD-10-CM    1. History of ovarian cyst  Z87.42 US Pelvic Transabdominal and Transvaginal      2. History of oophorectomy, unilateral  Z90.721 US Pelvic Transabdominal and Transvaginal             PLAN:    Given 's handouts, including when to have Nexplanon removed, list of danger s/sx, side effects and follow up recommended. Encouraged condom use for prevention of STD. Advised to call for any fever, for prolonged or severe pain or bleeding, abnormal vaginal dischage. She was advised to use pain medications (ibuprofen) as needed for mild to moderate pain.     \"I, Amber Forrest MD , am serving as a scribe to document services personally performed by CNM based on the provider's statements to me.\" - Amber Forrest MD   The encounter was performed by me and scribed by the resident. The scribed note accurately reflects my personal services and decisions made by me.     NASIM Hardy CNM    "

## 2023-11-20 NOTE — PROGRESS NOTES
[facility] Dermato-allergology Note  Office visit  Encounter Date: Nov 21, 2023  ____________________________________________    CC: No chief complaint on file.      HPI:  (Nov 21, 2023)  Ms. Malika Garcia is a(n) 28 year old female who presents today as new patient for allergy consultation    Malika's symptoms include sneezing, runny nose, stuffy nose, difficulty breathing / wheezing.   No watery eyes. Doesn't like to take medications, but wears a mask, which seems to help, and if it's really bad, and she has air purifiers around the house.     Notes an allergy to her dog, which is new for her.     - otherwise feeling well in usual state of health    Physical exam:  General: In no acute distress, well-developed, well-nourished  Eyes: no conjunctivitis  ENT: no signs of rhinitis   Pulmonary: no wheezing or coughing  Skin: Focused examination of the skin on test sites was performed = see test results below    Past Medical History:   Patient Active Problem List   Diagnosis    Cyst of ovary    Degeneration of posterior vitreous body    Panic disorder    Nexplanon in place     Past Medical History:   Diagnosis Date    Anxiety     Bilateral ovarian cysts 2015       Allergies:  Allergies   Allergen Reactions    Penicillins Anaphylaxis and Hives     Hives, itching, bruising     Venlafaxine Itching     Redness and itching    Dogs Other (See Comments)     Nasal congestion       Medications:  Current Outpatient Medications   Medication    adapalene (DIFFERIN) 0.1 % external gel    cyclobenzaprine (FLEXERIL) 5 MG tablet    etonogestrel (NEXPLANON) 68 MG IMPL    fluticasone (FLONASE) 50 MCG/ACT nasal spray    hydrOXYzine (VISTARIL) 25 MG capsule    lifitegrast (XIIDRA) 5 % opthalmic solution    loratadine (CLARITIN) 10 MG tablet    PARoxetine (PAXIL) 10 MG tablet     No current facility-administered medications for this visit.       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 Patient has the  following hobbies or non-occupational exposure none    Family History:  Family History   Problem Relation Age of Onset    Hypertension Mother     Diabetes Maternal Grandfather     No Known Problems Half-Sister     No Known Problems Half-Sister     No Known Problems Half-Sister        Previous Labs, Allergy Tests, Dermatopathology, Imaging:  None    Referred By: NASIM Macedo [facility]  [address] [place] [address]     Allergy Tests:    Past Allergy Test    Order for Future Allergy Testing:    [x] Outpatient  [] Inpatient: [facility]..../ Bed ....       Skin Atopy (atopic dermatitis) [x] Yes   [] No .........  Contact allergies:   [] Yes   [x] No ..........  Hand eczema:   [] Yes   [x] No           Leading hand:   [] R   [] L       [] Ambidextrous         Drug allergies:        [x] Yes   [] No  which?...... Penicillins    Urticaria/Angioedema  [] Yes   [] No .........  Food Allergy:  [] Yes   [x] No  which?......  Pets :  [x] Yes   [] No  which?......   Contact allergy to dog.       []  Rhinitis   [] Conjunctivitis   [] Sinusitis   [] Polyposis   [] Otitis   [] Pharyngitis         []  Postnasal drip    []  none  Operations:   [] Tonsils   [] Septum   [] Sinus   [] Polyposis        [] Asthma bronchiale   [] Coughing      [x]  none  Symptoms (mostly Rhinoconjunctivitis and Asthma) aggravated by:  Season   [] I   [] II   [] III   [] IV   []V   []VI   []VII   []VIII   []IX   []X   []XI   []XII     [x] perennial   Day time      [] morning   [] noon      [] evening        [] night    [x] whole day........  []  none  Location/changes    [x] inside        [] outside   [] mountains    [] sea     [] others.............   []  none  Triggers, specific     [x] animals     [] plants     [] dust              [] others ...........................    []  none  Triggers, others       [] work          [] psyche    [] sport            [] others .............................  []  none  Irritant                [] phys efforts []  smoke    [] heat/cold     [] odors  []others............... []  none    Order for PATCH TESTS  Reason for tests (suspected allergy): not necessary  Known previous allergies: n/a  Standardized panels  [] Standard panel (40 tests)  [] Preservatives & Antimicrobials (31 tests)  [] Emulsifiers & Additives (25 tests)   [] Perfumes/Flavours & Plants (25 tests)  [] Hairdresser panel (12 tests)  [] Rubber Chemicals (22 tests)  [] Plastics (26 tests)  [] Colorants/Dyes/Food additives (20 tests)  [] Metals (implants/dental) (24 tests)  [] Local anaesthetics/NSAIDs (13 tests)  [] Antibiotics & Antimycotics (14 tests)   [] Corticosteroids (15 tests)   [] Photopatch test (62 tests)   [] others: ...      [] Patient's own products: ...  DO NOT test if chemical or biological identity is unknown!   always ask from patient the product information and safety sheets (MSDS)       Order for PRICK TESTS    Reason for tests (suspected allergy): RC, PND  Known previous allergies: n/a    Standardized prick panels  [] Atopic panel (20 tests)  [] Pediatric Panel (12 tests)  [] Milk, Meat, Eggs, Grains (20 tests)   [] Dust, Epithelia, Feathers (10 tests)  [] Fish, Seafood, Shellfish (17 tests)  [] Nuts, Beans (14 tests)  [] Spice, Vegetable, Fruit (17 tests)  [] Pollen Panel = Tree, Grass, Weed (24 tests)  [] Others: ...      [] Patient's own products: ...  DO NOT test if chemical or biological identity is unknown!   always ask from patient the product information and safety sheets (MSDS)     Standardized intradermal tests  [] Penicillium notatum [] Aspergillus fumigatus [] House dust mites D.far & D. pteron  [] Cat    [] dog  [] Others: ...  [] Bee venom   [] Wasp venom  !!Specific protocol with dilutions!!       Order for Drug allergy tests (prick & Intradermal &  patch tests)    [] Penicillin G  [] Ampicillin [] Cefazolin   [] Ceftriaxone   [] Ceftazidime  [] Bactrim    [] Others: ...  Order for ... as test date    Atopy Screen (Placed Nov  21, 2023)  No Substance Readings (15 min) Evaluation   POS Histamine 1mg/ml ++    NEG NaCl 0.9% -      No Substance Readings (15 min) Evaluation   1 Alternaria alternata (tenuis)  +    2 Cladosporium herbarum +    3 Aspergillus fumigatus -    4 Penicillium notatum -    5 Dermatophagoides pteronyssinus +++    6 Dermatophagoides farinae +++    7 Dog epithelium (canis spp) +    8 Cat hair (nina catus) ++    9 Cockroach   (Blatella americana & germanica) -    10 Grass mix midwest   (Misa, Orchard, Redtop, Cyrus) -    11 Cale grass (sorghum halepense) -    12 Weed mix   (common Cocklebur, Lamb s quarters, rough redroot Pigweed, Dock/Sorrel) -    13 Mug wort (artemisia vulgare) -    14 Ragweed giant/short (ambrosia spp) -    15 White birch (Betula papyrifera) -    16 Tree mix 1 (Pecan, Maple BHR, Oak RVW, american [place], black [place]) -    17 Red cedar (juniperus virginia) +    18 Tree mix 2   (white [place], river/red Birch, black [place], common Grand, american Elm) -    19 Box elder/Maple mix (acer spp) -    20 Darke shagbark (carya ovata) -           Conclusion     ________________________________    Assessment & Plan:    ==> Final Diagnosis:     # atopic predisposition with  Perennial Rhinoconjunctivitis, postnasal drip and some coughing with proven sensitization to house dust mites  Contact urticaria with cat and proven sensitization  Clinically not very relevant sensitization to seasonal molds and cedar pollens  * chronic illness with exacerbation, progression, side effects from treatment      These conclusions are made at the best of one's knowledge and belief based on the provided evidence such as patient's history and allergy test results and they can change over time or can be incomplete because of missing information's.    ==> Treatment Plan:    >> info given HOUSE DUST MITES reduction  >> Allegra 180mg at bedtime and Nasonex nasal spray at bedtime    Procedures Performed: Allergy tests, including  prick tests    Staff and Resident:  Provider    Staff Physician Comments:  I saw and evaluated the patient with the resident and I agree with the assessment and plan as documented in the resident's note.    Gildardo Roy MD  Professor  Head of Dermato-Allergy Division  Department of Dermatology  [facility]     Follow-up in Derm-Allergy clinic in about 6 weeks to see how ttx works    I spent a total of 35minutes with Malika Garcia. This time was spent counseling the patient and/or coordinating care, explaining the allergy tests, performing allergy tests and assessing the clinical relevance.

## 2023-11-21 ENCOUNTER — PRE VISIT (OUTPATIENT)
Dept: ALLERGY | Facility: CLINIC | Age: 28
End: 2023-11-21

## 2023-11-21 ENCOUNTER — OFFICE VISIT (OUTPATIENT)
Dept: ALLERGY | Facility: CLINIC | Age: 28
End: 2023-11-21
Attending: NURSE PRACTITIONER
Payer: COMMERCIAL

## 2023-11-21 DIAGNOSIS — J30.81 CAT ALLERGY DUE TO BOTH AIRBORNE AND SKIN CONTACT: ICD-10-CM

## 2023-11-21 DIAGNOSIS — L23.81 CAT ALLERGY DUE TO BOTH AIRBORNE AND SKIN CONTACT: ICD-10-CM

## 2023-11-21 DIAGNOSIS — R09.82 POSTNASAL DRIP: ICD-10-CM

## 2023-11-21 DIAGNOSIS — J30.89 NON-SEASONAL ALLERGIC RHINITIS, UNSPECIFIED TRIGGER: ICD-10-CM

## 2023-11-21 DIAGNOSIS — Z91.09 HOUSE DUST MITE ALLERGY: Primary | ICD-10-CM

## 2023-11-21 PROCEDURE — 99203 OFFICE O/P NEW LOW 30 MIN: CPT | Mod: 25 | Performed by: DERMATOLOGY

## 2023-11-21 PROCEDURE — 95004 PERQ TESTS W/ALRGNC XTRCS: CPT | Mod: GC | Performed by: DERMATOLOGY

## 2023-11-21 RX ORDER — MOMETASONE FUROATE MONOHYDRATE 50 UG/1
2 SPRAY, METERED NASAL AT BEDTIME
Qty: 17 G | Refills: 3 | Status: SHIPPED | OUTPATIENT
Start: 2023-11-21 | End: 2024-02-28

## 2023-11-21 RX ORDER — FEXOFENADINE HCL 180 MG/1
180 TABLET ORAL AT BEDTIME
Qty: 30 TABLET | Refills: 4 | Status: SHIPPED | OUTPATIENT
Start: 2023-11-21 | End: 2024-02-28

## 2023-11-21 NOTE — NURSING NOTE
Chief Complaint   Patient presents with    Allergy Consult     Allergy consult for J30.89 (ICD-10-CM) - Non-seasonal allergic rhinitis, unspecified trigger. Per pt, moved to [place] from [place] about a year ago and has had sinus issues since, some wheezing. No Hx of asthma. Mostly in the house. Does have a dog. No antihistamines in at least 1 month.     Cira Delgado RN

## 2023-11-21 NOTE — LETTER
11/21/2023         RE: Malika Garcia  [address]  [facility] [place] [address]        Dear Colleague,    Thank you for referring your patient, Malika Garcia, to the [facility] [place]. Please see a copy of my visit note below.    [facility] D[facility]ato-[facility]ology Note  Office visit  Encounter Date: Nov 21, 2023  ____________________________________________    CC: No chief complaint on file.      HPI:  (Nov 21, 2023)  Ms. Malika Garcia is a(n) 28 year old female who presents today as new patient for allergy consultation    Augustas symptoms include sneezing, runny nose, stuffy nose, difficulty breathing / wheezing.   No watery eyes. Doesn't like to take medications, but wears a mask, which seems to help, and if it's really bad, and she has air purifiers around the house.     Notes an allergy to her dog, which is new for her.     - otherwise feeling well in usual state of health    Physical exam:  General: In no acute distress, well-developed, well-nourished  Eyes: no conjunctivitis  ENT: no signs of rhinitis   Pulmonary: no wheezing or coughing  Skin: Focused examination of the skin on test sites was performed = see test results below    Past Medical History:   Patient Active Problem List   Diagnosis     Cyst of ovary     Degeneration of posterior vitreous body     Panic disorder     Nexplanon in place     Past Medical History:   Diagnosis Date     Anxiety      Bilateral ovarian cysts 2015       Allergies:  Allergies   Allergen Reactions     Penicillins Anaphylaxis and Hives     Hives, itching, bruising      Venlafaxine Itching     Redness and itching     Dogs Other (See Comments)     Nasal congestion       Medications:  Current Outpatient Medications   Medication     adapalene (DIFFERIN) 0.1 % external gel     cyclobenzaprine (FLEXERIL) 5 MG tablet     etonogestrel (NEXPLANON) 68 MG IMPL     fluticasone (FLONASE) 50 MCG/ACT nasal spray     hydrOXYzine (VISTARIL) 25 MG  capsule     lifitegrast (XIIDRA) 5 % opthalmic solution     loratadine (CLARITIN) 10 MG tablet     PARoxetine (PAXIL) 10 MG tablet     No current facility-administered medications for this visit.       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 Patient has the following hobbies or non-occupational exposure none    Family History:  Family History   Problem Relation Age of Onset     Hypertension Mother      Diabetes Maternal Grandfather      No Known Problems Half-Sister      No Known Problems Half-Sister      No Known Problems Half-Sister        Previous Labs, Allergy Tests, Dermatopathology, Imaging:  None    Referred By: NASIM Macedo [address] [address]     Allergy Tests:    Past Allergy Test    Order for Future Allergy Testing:    [x] Outpatient  [] Inpatient: [facility]..../ Bed ....       Skin Atopy (atopic dermatitis) [x] Yes   [] No .........  Contact allergies:   [] Yes   [x] No ..........  Hand eczema:   [] Yes   [x] No           Leading hand:   [] R   [] L       [] Ambidextrous         Drug allergies:        [x] Yes   [] No  which?...... Penicillins    Urticaria/Angioedema  [] Yes   [] No .........  Food Allergy:  [] Yes   [x] No  which?......  Pets :  [x] Yes   [] No  which?......   Contact allergy to dog.       []  Rhinitis   [] Conjunctivitis   [] Sinusitis   [] Polyposis   [] Otitis   [] Pharyngitis         []  Postnasal drip    []  none  Operations:   [] Tonsils   [] Septum   [] Sinus   [] Polyposis        [] Asthma bronchiale   [] Coughing      [x]  none  Symptoms (mostly Rhinoconjunctivitis and Asthma) aggravated by:  Season   [] I   [] II   [] III   [] IV   []V   []VI   []VII   []VIII   []IX   []X   []XI   []XII     [x] perennial   Day time      [] morning   [] noon      [] evening        [] night    [x] whole day........  []  none  Location/changes    [x] inside        [] outside   [] mountains    [] sea     [] others.............   []   none  Triggers, specific     [x] animals     [] plants     [] dust              [] others ...........................    []  none  Triggers, others       [] work          [] psyche    [] sport            [] others .............................  []  none  Irritant                [] phys efforts [] smoke    [] heat/cold     [] odors  []others............... []  none    Order for PATCH TESTS  Reason for tests (suspected allergy): not necessary  Known previous allergies: n/a  Standardized panels  [] Standard panel (40 tests)  [] Preservatives & Antimicrobials (31 tests)  [] Emulsifiers & Additives (25 tests)   [] Perfumes/Flavours & Plants (25 tests)  [] Hairdresser panel (12 tests)  [] Rubber Chemicals (22 tests)  [] Plastics (26 tests)  [] Colorants/Dyes/Food additives (20 tests)  [] Metals (implants/dental) (24 tests)  [] Local anaesthetics/NSAIDs (13 tests)  [] Antibiotics & Antimycotics (14 tests)   [] Corticosteroids (15 tests)   [] Photopatch test (62 tests)   [] others: ...      [] Patient's own products: ...  DO NOT test if chemical or biological identity is unknown!   always ask from patient the product information and safety sheets (MSDS)       Order for PRICK TESTS    Reason for tests (suspected allergy): RC, PND  Known previous allergies: n/a    Standardized prick panels  [] Atopic panel (20 tests)  [] Pediatric Panel (12 tests)  [] Milk, Meat, Eggs, Grains (20 tests)   [] Dust, Epithelia, Feathers (10 tests)  [] Fish, Seafood, Shellfish (17 tests)  [] Nuts, Beans (14 tests)  [] Spice, Vegetable, Fruit (17 tests)  [] Pollen Panel = Tree, Grass, Weed (24 tests)  [] Others: ...      [] Patient's own products: ...  DO NOT test if chemical or biological identity is unknown!   always ask from patient the product information and safety sheets (MSDS)     Standardized intradermal tests  [] Penicillium notatum [] Aspergillus fumigatus [] House dust mites D.far & JYOTHI pteron  [] Cat    [] dog  [] Others: ...  [] Bee  venom   [] Wasp venom  !!Specific protocol with dilutions!!       Order for Drug allergy tests (prick & Intradermal &  patch tests)    [] Penicillin G  [] Ampicillin [] Cefazolin   [] Ceftriaxone   [] Ceftazidime  [] Bactrim    [] Others: ...  Order for ... as test date    Atopy Screen (Placed Nov 21, 2023)  No Substance Readings (15 min) Evaluation   POS Histamine 1mg/ml ++    NEG NaCl 0.9% -      No Substance Readings (15 min) Evaluation   1 Alternaria alternata (tenuis)  +    2 Cladosporium herbarum +    3 Aspergillus fumigatus -    4 Penicillium notatum -    5 Dermatophagoides pteronyssinus +++    6 Dermatophagoides farinae +++    7 Dog epithelium (canis spp) +    8 Cat hair (nina catus) ++    9 Cockroach   (Blatella americana & germanica) -    10 Grass mix midwest   (Misa, Orchard, Redtop, Cyrus) -    11 Cale grass (sorghum halepense) -    12 Weed mix   (common Cocklebur, Lamb s quarters, rough redroot Pigweed, Dock/Sorrel) -    13 Mug wort (artemisia vulgare) -    14 Ragweed giant/short (ambrosia spp) -    15 White birch (Betula papyrifera) -    16 Tree mix 1 (Pecan, Maple BHR, Oak RVW, american [place], black [place]) -    17 Red cedar (juniperus virginia) +    18 Tree mix 2   (white Efrain, river/red Birch, black Wallace, common [place], american Elm) -    19 Box elder/Maple mix (acer spp) -    20 Hercules shagbark (carya ovata) -           Conclusion     ________________________________    Assessment & Plan:    ==> Final Diagnosis:     # atopic predisposition with  Perennial Rhinoconjunctivitis, postnasal drip and some coughing with proven sensitization to house dust mites  Contact urticaria with cat and proven sensitization  Clinically not very relevant sensitization to seasonal molds and cedar pollens  * chronic illness with exacerbation, progression, side effects from treatment      These conclusions are made at the best of one's knowledge and belief based on the provided evidence such as patient's  history and allergy test results and they can change over time or can be incomplete because of missing information's.    ==> Treatment Plan:    >> info given HOUSE DUST MITES reduction  >> Allegra 180mg at bedtime and Nasonex nasal spray at bedtime    Procedures Performed: Allergy tests, including prick tests    Staff and Resident:  Provider    Staff Physician Comments:  I saw and evaluated the patient with the resident and I agree with the assessment and plan as documented in the resident's note.    Gildardo Roy MD  Professor  Head of Dermato-Allergy Division  Department of Dermatology  [facility]     Follow-up in Derm-Allergy clinic in about 6 weeks to see how ttx works    I spent a total of 35minutes with Malika Garcia. This time was spent counseling the patient and/or coordinating care, explaining the allergy tests, performing allergy tests and assessing the clinical relevance.         Again, thank you for allowing me to participate in the care of your patient.        Sincerely,        Gildardo Roy MD

## 2023-11-21 NOTE — PATIENT INSTRUCTIONS
House Dust Mite Allergy        The house dust mite is an arachnid about 0.3 mm in size and not visible to the naked eye. There are around 150 species of house dust mites in the world. One mite produces up to 40 fecal droppings a day. One teaspoonful of bedroom dust contains an average of nearly 1000 mites and 250,000 minute droppings.    Causes and triggers of house dust mite allergy  The house dust mite requires a warm, moist environment without light in order to live and reproduce. Our beds are ideal. The mite feeds on human and animal skin scales. The allergen is mainly contained in the mite's feces. The feces contain allergy-triggering constituents which are spread in fine dust, are breathed in and can cause an allergic reaction.    Symptoms  When the allergens come into contact with the mucous membranes in the eyes, nose, mouth and throat, sufferers develop symptoms typical of an allergic cold (allergic rhinitis) or an allergic inflammation of the conjunctiva (allergic conjunctivitis): blocked or runny nose, sneezing, red, itchy eyes. If all of these symptoms are present, then the condition is also known as rhinoconjunctivitis. Often, the upper respiratory tract becomes chronically inflamed, primarily because house dust mites are present all year round.  The symptoms of house dust mite allergy typically occur in the morning and are more frequent in the cold months of the year.    Therapy and treatment  As a first step, mattress, pillows and duvet/comforter should be placed in mite-proof or anti-mite covers, sometimes known as encasings. Alternatively you can use pillows or comforter that can be washed at over 130 F monthly. At the same time, house dust should be minimized. If necessary, the symptoms can be treated with medication, for example antihistamines in the form of nasal sprays, eye drops and tablets. Desensitization/specific immunotherapy (SIT) is recommended for house dust allergy if all the measures  "above are not sufficient.    Tips and tricks:  Keep room temperature at 66-70 F and relative air humidity at a maximum of 50%.  Ideally, thoroughly air your home two to three times a day for 5 to 10 minutes each time.  Wash bed linens in at least 130 F every week.  Remove stuffed animals or freeze them every other week.  Keep ceiling fans off in the bedroom as they can stir up dust mite allergens.  Remove dust from furniture with a damp cloth and regularly wet mop floors.  Do not put pot and hydroponic plants in the bedroom and also avoid putting too many in living areas, as they increase room humidity.  When staying overnight in other accommodations, we recommend taking your own bed linen and the above anti-mite mattress covers with you.  Remove upholstered furniture from the bedroom and consider removing the carpets. Ideally, use sealed parquet or laminate do, cork tiles or do made of wood, novilon or PVC.  Maybe additionally reduce dust mites in mattress, upholstery, or do using hot steam .      Modified from \"House Dust Mite Allergy\" by [employer]! [facility].   "

## 2023-12-13 ENCOUNTER — PRE VISIT (OUTPATIENT)
Dept: OTOLARYNGOLOGY | Facility: CLINIC | Age: 28
End: 2023-12-13

## 2023-12-14 ENCOUNTER — TELEPHONE (OUTPATIENT)
Dept: OBGYN | Facility: CLINIC | Age: 28
End: 2023-12-14
Payer: COMMERCIAL

## 2023-12-14 NOTE — TELEPHONE ENCOUNTER
Health Call Center    Phone Message    May a detailed message be left on voicemail: yes     Reason for Call: Other: Pt calling requesting her last visit with Sabrina kendy clinical notes be sent to her ins company([facility]). Pt said that [facility] should be calling [place] to discuss further. Pt will call back with a fax number. Call pt if you have any questions thank you!     Action Taken: Message routed to:  Other: obgyn    Travel Screening: Not Applicable

## 2023-12-15 NOTE — TELEPHONE ENCOUNTER
Returned patients call. Provided number to HIM. Encouraged pt to call back with additional questions/concerns.

## 2024-01-09 ENCOUNTER — OFFICE VISIT (OUTPATIENT)
Dept: UROLOGY | Facility: CLINIC | Age: 29
End: 2024-01-09
Payer: COMMERCIAL

## 2024-01-09 VITALS
SYSTOLIC BLOOD PRESSURE: 122 MMHG | HEIGHT: 61 IN | HEART RATE: 84 BPM | WEIGHT: 117 LBS | DIASTOLIC BLOOD PRESSURE: 84 MMHG | BODY MASS INDEX: 22.09 KG/M2

## 2024-01-09 DIAGNOSIS — R39.89 BLADDER PAIN: Primary | ICD-10-CM

## 2024-01-09 LAB
ALBUMIN UR-MCNC: NEGATIVE MG/DL
APPEARANCE UR: CLEAR
BILIRUB UR QL STRIP: NEGATIVE
COLOR UR AUTO: NORMAL
GLUCOSE UR STRIP-MCNC: NEGATIVE MG/DL
HGB UR QL STRIP: NEGATIVE
KETONES UR STRIP-MCNC: NEGATIVE MG/DL
LEUKOCYTE ESTERASE UR QL STRIP: NEGATIVE
NITRATE UR QL: NEGATIVE
PH UR STRIP: 6 [PH] (ref 5–7)
SP GR UR STRIP: 1.02 (ref 1–1.03)
UROBILINOGEN UR STRIP-MCNC: NORMAL MG/DL

## 2024-01-09 PROCEDURE — 99214 OFFICE O/P EST MOD 30 MIN: CPT | Performed by: PHYSICIAN ASSISTANT

## 2024-01-09 PROCEDURE — 87086 URINE CULTURE/COLONY COUNT: CPT | Performed by: PHYSICIAN ASSISTANT

## 2024-01-09 PROCEDURE — 87798 DETECT AGENT NOS DNA AMP: CPT | Mod: 90 | Performed by: PATHOLOGY

## 2024-01-09 PROCEDURE — 87563 M. GENITALIUM AMP PROBE: CPT | Mod: 90 | Performed by: PATHOLOGY

## 2024-01-09 PROCEDURE — 81003 URINALYSIS AUTO W/O SCOPE: CPT | Performed by: PATHOLOGY

## 2024-01-09 PROCEDURE — 99000 SPECIMEN HANDLING OFFICE-LAB: CPT | Performed by: PATHOLOGY

## 2024-01-09 ASSESSMENT — PAIN SCALES - GENERAL: PAINLEVEL: NO PAIN (0)

## 2024-01-09 NOTE — PROGRESS NOTES
"Name: Malika Garcia    MRN: [id number]   YOB: 1995                 Chief Complaint:   Bladder pain         Assessment and Plan:   28 year old female with chronic bladder pain. Discussed possible etiologies. She had temporary improvement with treatment of ureaplasma infection in 2022. We discussed management options to include dietary modification, pharmacotherapy, PFPT, or further evaluation with cystoscopy prior to considering more advanced treatment options. She would like to avoid medications for now and is interested in cystoscopy to ensure no intravesical pathology.   -Send urine for UA/UC, ureaplasma/mycoplasma.  -Avoid bladder irritants and known dietary triggers.  -Cystoscopy with Dr. James or Dr. Chase next available.   -Follow up with me if cystoscopy is unrevealing to discuss further management.     Remedios Kirby PA-C  January 9, 2024          History of Present Illness:   Malika Garcia is a 28 year old female seen today for evaluation of bladder pain. She previously saw Bisi Mckeon PA-C in 2022. Her prior notes from 6/17/22 and 7/12/22 were reviewed. Malika reports many years of bladder pain. It started after a severe episode of \"dysuria.\" Since then, she has bladder discomfort which is made worse by drinking anything other than water. The pain gets worse when her bladder is full and improves somewhat after voiding. She denies any gross hematuria or pain with urination. She does urinate frequently - every few hours during the day and 1-2 times overnight. She reports urinary urgency but no incontinence.    On average, she drinks 20-40 oz of water per day, sometimes more. She is not concerned about STIs.    She was previously diagnosed with ureaplasma in the urine s/p treatment with Doxycycline in 2022 which did help to improve symptoms temporarily.          Past Medical History:     Past Medical History:   Diagnosis Date    Anxiety     Bilateral ovarian cysts 2015            Past Surgical " History:     Past Surgical History:   Procedure Laterality Date    AS REMOVAL OF OVARIAN CYST(S) Left 2015            Social History:     Social History     Tobacco Use    Smoking status: Never    Smokeless tobacco: Never   Substance Use Topics    Alcohol use: Not Currently            Family History:     Family History   Problem Relation Age of Onset    Hypertension Mother     Diabetes Maternal Grandfather     No Known Problems Half-Sister     No Known Problems Half-Sister     No Known Problems Half-Sister             Allergies:     Allergies   Allergen Reactions    Penicillins Anaphylaxis and Hives     Hives, itching, bruising     Venlafaxine Itching     Redness and itching    Dogs Other (See Comments)     Nasal congestion            Medications:     Current Outpatient Medications   Medication Sig    etonogestrel (NEXPLANON) 68 MG IMPL 1 each by Subdermal route once    hydrOXYzine (VISTARIL) 25 MG capsule Take 1 capsule (25 mg) by mouth 3 times daily as needed for itching    PARoxetine (PAXIL) 10 MG tablet Take 1 tablet (10 mg) by mouth every morning Every morning    adapalene (DIFFERIN) 0.1 % external gel Apply topically every 24 hours (Patient not taking: Reported on 1/9/2024)    cyclobenzaprine (FLEXERIL) 5 MG tablet  (Patient not taking: Reported on 1/9/2024)    fexofenadine (ALLEGRA) 180 MG tablet Take 1 tablet (180 mg) by mouth at bedtime (Patient not taking: Reported on 1/9/2024)    fluticasone (FLONASE) 50 MCG/ACT nasal spray Spray 2 sprays into both nostrils daily (Patient not taking: Reported on 11/15/2023)    lifitegrast (XIIDRA) 5 % opthalmic solution Place 1 drop into both eyes 2 times daily (Patient not taking: Reported on 8/2/2023)    loratadine (CLARITIN) 10 MG tablet Take 10 mg by mouth daily (Patient not taking: Reported on 11/15/2023)    mometasone (NASONEX) 50 MCG/ACT nasal spray Spray 2 sprays into both nostrils at bedtime (Patient not taking: Reported on 1/9/2024)     No current  "facility-administered medications for this visit.          Physical Exam:   /84   Pulse 84   Ht 1.549 m (5' 1\")   Wt 53.1 kg (117 lb)   BMI 22.11 kg/m    GENERAL: Healthy, alert and no distress  EYES: Eyes grossly normal to inspection.  No discharge or erythema, or obvious scleral/conjunctival abnormalities.  RESP: No audible wheeze, cough, or visible cyanosis.  No visible retractions or increased work of breathing.    SKIN: Visible skin clear. No significant rash, abnormal pigmentation or lesions.  NEURO: Cranial nerves grossly intact.  Mentation and speech appropriate for age.  PSYCH: Mentation appears normal, affect normal/bright, judgement and insight intact, normal speech and appearance well-groomed.       Labs:      Lab Results   Component Value Date    CULTURE <10,000 CFU/mL Urogenital jimmy 07/26/2022    CULTURE  07/08/2022     <10,000 CFU/mL Streptococcus agalactiae (Group B Streptococcus)     7/8/2022 - positive ureaplasma urealyticum by PCR      Imaging:    Pelvic Ultrasound   6/4/2022    FINDINGS:     Uterus: 4.6 x 2 x 2.5 cm. Normal echotexture of the myometrium noted with no masses are seen.     Endometrium: Thin and echogenic measuring 1-2 mm. The measurement reported by the sonographer of 4 mm includes the hypoechoic fluid in the endocervical canal. A small amount of fluid is present within the endocervical canal.     Right ovary: 2 x 2.6 x 1.8 cm. The right ovary is normal in appearance and echotexture. Normal arterial and venous blood flow seen in the right ovary.     Left ovary: Patient is status post left oophorectomy.     Cul-de-sac: No significant ascites noted.     Impression    1. Unremarkable pelvic ultrasound.       30 minutes spent on the date of the encounter doing chart review, review of test results, patient visit, and documentation      "

## 2024-01-09 NOTE — PATIENT INSTRUCTIONS
UROLOGY CLINIC VISIT PATIENT INSTRUCTIONS    Schedule cystoscopy with Dr. James or Dr. Chase next available.    We will send your urine for a urinalysis and culture as well as ureaplasma/mycoplasma cultures. We will contact you when results are available.    Below is a list of foods that can irritate the bladder that you should try to limit or avoid.    Caffeinated soft drinks.  Coffee.  Tea.  Chocolate.  Tomato-based foods.  Acidic juices and fruits.  Alcohol.  Carbonated drinks.  Aspartame/Nutrasweet.      If you have any issues, questions or concerns in the meantime, do not hesitate to contact us at [phone] or via [employer].     It was a pleasure meeting with you today.  Thank you for allowing me and my team the privilege of caring for you today.  YOU are the reason we are here, and I truly hope we provided you with the excellent service you deserve.  Please let us know if there is anything else we can do for you so that we can be sure you are leaving completely satisfied with your care experience.

## 2024-01-09 NOTE — LETTER
"1/9/2024       RE: Malika Garcia  [address]  [facility] [address]     Dear Colleague,    Thank you for referring your patient, Malika Garcia, to the [facility]OLOGY [facility]IN[facility] [place] [facility]. Please see a copy of my visit note below.    Name: Malika Garcia    MRN: [id number]   YOB: 1995                 Chief Complaint:   Bladder pain         Assessment and Plan:   28 year old female with chronic bladder pain. Discussed possible etiologies. She had temporary improvement with treatment of ureaplasma infection in 2022. We discussed management options to include dietary modification, pharmacotherapy, PFPT, or further evaluation with cystoscopy prior to considering more advanced treatment options. She would like to avoid medications for now and is interested in cystoscopy to ensure no intravesical pathology.   -Send urine for UA/UC, ureaplasma/mycoplasma.  -Avoid bladder irritants and known dietary triggers.  -Cystoscopy with Dr. James or Dr. Chase next available.   -Follow up with me if cystoscopy is unrevealing to discuss further management.     Remedios Kirby PA-C  January 9, 2024          History of Present Illness:   Malika Garcia is a 28 year old female seen today for evaluation of bladder pain. She previously saw Bisi Mckeon PA-C in 2022. Her prior notes from 6/17/22 and 7/12/22 were reviewed. Malika reports many years of bladder pain. It started after a severe episode of \"dysuria.\" Since then, she has bladder discomfort which is made worse by drinking anything other than water. The pain gets worse when her bladder is full and improves somewhat after voiding. She denies any gross hematuria or pain with urination. She does urinate frequently - every few hours during the day and 1-2 times overnight. She reports urinary urgency but no incontinence.    On average, she drinks 20-40 oz of water per day, sometimes more. She is not " concerned about STIs.    She was previously diagnosed with ureaplasma in the urine s/p treatment with Doxycycline in 2022 which did help to improve symptoms temporarily.          Past Medical History:     Past Medical History:   Diagnosis Date    Anxiety     Bilateral ovarian cysts 2015            Past Surgical History:     Past Surgical History:   Procedure Laterality Date    AS REMOVAL OF OVARIAN CYST(S) Left 2015            Social History:     Social History     Tobacco Use    Smoking status: Never    Smokeless tobacco: Never   Substance Use Topics    Alcohol use: Not Currently            Family History:     Family History   Problem Relation Age of Onset    Hypertension Mother     Diabetes Maternal Grandfather     No Known Problems Half-Sister     No Known Problems Half-Sister     No Known Problems Half-Sister             Allergies:     Allergies   Allergen Reactions    Penicillins Anaphylaxis and Hives     Hives, itching, bruising     Venlafaxine Itching     Redness and itching    Dogs Other (See Comments)     Nasal congestion            Medications:     Current Outpatient Medications   Medication Sig    etonogestrel (NEXPLANON) 68 MG IMPL 1 each by Subdermal route once    hydrOXYzine (VISTARIL) 25 MG capsule Take 1 capsule (25 mg) by mouth 3 times daily as needed for itching    PARoxetine (PAXIL) 10 MG tablet Take 1 tablet (10 mg) by mouth every morning Every morning    adapalene (DIFFERIN) 0.1 % external gel Apply topically every 24 hours (Patient not taking: Reported on 1/9/2024)    cyclobenzaprine (FLEXERIL) 5 MG tablet  (Patient not taking: Reported on 1/9/2024)    fexofenadine (ALLEGRA) 180 MG tablet Take 1 tablet (180 mg) by mouth at bedtime (Patient not taking: Reported on 1/9/2024)    fluticasone (FLONASE) 50 MCG/ACT nasal spray Spray 2 sprays into both nostrils daily (Patient not taking: Reported on 11/15/2023)    lifitegrast (XIIDRA) 5 % opthalmic solution Place 1 drop into both eyes 2 times daily  "(Patient not taking: Reported on 8/2/2023)    loratadine (CLARITIN) 10 MG tablet Take 10 mg by mouth daily (Patient not taking: Reported on 11/15/2023)    mometasone (NASONEX) 50 MCG/ACT nasal spray Spray 2 sprays into both nostrils at bedtime (Patient not taking: Reported on 1/9/2024)     No current facility-administered medications for this visit.          Physical Exam:   /84   Pulse 84   Ht 1.549 m (5' 1\")   Wt 53.1 kg (117 lb)   BMI 22.11 kg/m    GENERAL: Healthy, alert and no distress  EYES: Eyes grossly normal to inspection.  No discharge or erythema, or obvious scleral/conjunctival abnormalities.  RESP: No audible wheeze, cough, or visible cyanosis.  No visible retractions or increased work of breathing.    SKIN: Visible skin clear. No significant rash, abnormal pigmentation or lesions.  NEURO: Cranial nerves grossly intact.  Mentation and speech appropriate for age.  PSYCH: Mentation appears normal, affect normal/bright, judgement and insight intact, normal speech and appearance well-groomed.       Labs:      Lab Results   Component Value Date    CULTURE <10,000 CFU/mL Urogenital jimmy 07/26/2022    CULTURE  07/08/2022     <10,000 CFU/mL Streptococcus agalactiae (Group B Streptococcus)     7/8/2022 - positive ureaplasma urealyticum by PCR      Imaging:    Pelvic Ultrasound   6/4/2022    FINDINGS:     Uterus: 4.6 x 2 x 2.5 cm. Normal echotexture of the myometrium noted with no masses are seen.     Endometrium: Thin and echogenic measuring 1-2 mm. The measurement reported by the sonographer of 4 mm includes the hypoechoic fluid in the endocervical canal. A small amount of fluid is present within the endocervical canal.     Right ovary: 2 x 2.6 x 1.8 cm. The right ovary is normal in appearance and echotexture. Normal arterial and venous blood flow seen in the right ovary.     Left ovary: Patient is status post left oophorectomy.     Cul-de-sac: No significant ascites noted.     Impression    1. " Unremarkable pelvic ultrasound.       30 minutes spent on the date of the encounter doing chart review, review of test results, patient visit, and documentation

## 2024-01-10 LAB
BACTERIA UR CULT: ABNORMAL
BACTERIA UR CULT: ABNORMAL

## 2024-01-12 LAB
M GENITALIUM DNA SPEC QL NAA+PROBE: NOT DETECTED
M HOMINIS DNA SPEC QL NAA+PROBE: NOT DETECTED
U PARVUM DNA SPEC QL NAA+PROBE: NOT DETECTED
U UREALYTICUM DNA SPEC QL NAA+PROBE: NOT DETECTED

## 2024-02-22 DIAGNOSIS — F41.9 ANXIETY: ICD-10-CM

## 2024-02-28 ENCOUNTER — OFFICE VISIT (OUTPATIENT)
Dept: FAMILY MEDICINE | Facility: CLINIC | Age: 29
End: 2024-02-28
Payer: COMMERCIAL

## 2024-02-28 VITALS
BODY MASS INDEX: 23.43 KG/M2 | OXYGEN SATURATION: 98 % | RESPIRATION RATE: 18 BRPM | TEMPERATURE: 97.8 F | HEIGHT: 59 IN | SYSTOLIC BLOOD PRESSURE: 106 MMHG | WEIGHT: 116.2 LBS | DIASTOLIC BLOOD PRESSURE: 73 MMHG | HEART RATE: 82 BPM

## 2024-02-28 DIAGNOSIS — F41.9 ANXIETY: Primary | ICD-10-CM

## 2024-02-28 DIAGNOSIS — Z91.09 HOUSE DUST MITE ALLERGY: ICD-10-CM

## 2024-02-28 RX ORDER — HYDROXYZINE PAMOATE 25 MG/1
25 CAPSULE ORAL 3 TIMES DAILY PRN
Qty: 30 CAPSULE | Refills: 3 | Status: SHIPPED | OUTPATIENT
Start: 2024-02-28 | End: 2024-07-29

## 2024-02-28 RX ORDER — FEXOFENADINE HCL 180 MG/1
180 TABLET ORAL AT BEDTIME
Qty: 30 TABLET | Refills: 3 | Status: SHIPPED | OUTPATIENT
Start: 2024-02-28 | End: 2024-07-31

## 2024-02-28 RX ORDER — PAROXETINE 10 MG/1
10 TABLET, FILM COATED ORAL EVERY MORNING
Qty: 90 TABLET | Refills: 3 | Status: SHIPPED | OUTPATIENT
Start: 2024-02-28

## 2024-02-28 RX ORDER — PAROXETINE 10 MG/1
TABLET, FILM COATED ORAL
Qty: 90 TABLET | Refills: 3 | OUTPATIENT
Start: 2024-02-28

## 2024-02-28 RX ORDER — MOMETASONE FUROATE MONOHYDRATE 50 UG/1
2 SPRAY, METERED NASAL AT BEDTIME
Qty: 17 G | Refills: 3 | Status: SHIPPED | OUTPATIENT
Start: 2024-02-28 | End: 2024-07-31

## 2024-02-28 ASSESSMENT — ANXIETY QUESTIONNAIRES
GAD7 TOTAL SCORE: 0
GAD7 TOTAL SCORE: 0
1. FEELING NERVOUS, ANXIOUS, OR ON EDGE: NOT AT ALL
8. IF YOU CHECKED OFF ANY PROBLEMS, HOW DIFFICULT HAVE THESE MADE IT FOR YOU TO DO YOUR WORK, TAKE CARE OF THINGS AT HOME, OR GET ALONG WITH OTHER PEOPLE?: NOT DIFFICULT AT ALL
2. NOT BEING ABLE TO STOP OR CONTROL WORRYING: NOT AT ALL
7. FEELING AFRAID AS IF SOMETHING AWFUL MIGHT HAPPEN: NOT AT ALL
5. BEING SO RESTLESS THAT IT IS HARD TO SIT STILL: NOT AT ALL
6. BECOMING EASILY ANNOYED OR IRRITABLE: NOT AT ALL
7. FEELING AFRAID AS IF SOMETHING AWFUL MIGHT HAPPEN: NOT AT ALL
GAD7 TOTAL SCORE: 0
4. TROUBLE RELAXING: NOT AT ALL
IF YOU CHECKED OFF ANY PROBLEMS ON THIS QUESTIONNAIRE, HOW DIFFICULT HAVE THESE PROBLEMS MADE IT FOR YOU TO DO YOUR WORK, TAKE CARE OF THINGS AT HOME, OR GET ALONG WITH OTHER PEOPLE: NOT DIFFICULT AT ALL
3. WORRYING TOO MUCH ABOUT DIFFERENT THINGS: NOT AT ALL

## 2024-02-28 ASSESSMENT — PATIENT HEALTH QUESTIONNAIRE - PHQ9
SUM OF ALL RESPONSES TO PHQ QUESTIONS 1-9: 0
10. IF YOU CHECKED OFF ANY PROBLEMS, HOW DIFFICULT HAVE THESE PROBLEMS MADE IT FOR YOU TO DO YOUR WORK, TAKE CARE OF THINGS AT HOME, OR GET ALONG WITH OTHER PEOPLE: NOT DIFFICULT AT ALL
SUM OF ALL RESPONSES TO PHQ QUESTIONS 1-9: 0

## 2024-02-28 ASSESSMENT — PAIN SCALES - GENERAL: PAINLEVEL: NO PAIN (0)

## 2024-02-28 NOTE — NURSING NOTE
"ROOM:1  GARY WEISS    Preferred Name: Malika     How did you hear about us?  Current Patient     Services Provided? No    28 year old  Chief Complaint   Patient presents with    Recheck Medication     Medication Evaluation: Paxil (Patient needs refills)        Blood pressure 106/73, pulse 82, temperature 97.8  F (36.6  C), temperature source Oral, resp. rate 18, height 1.499 m (4' 11\"), weight 52.7 kg (116 lb 3.2 oz), SpO2 98%. Body mass index is 23.47 kg/m .  BP completed using cuff size:        Patient Active Problem List   Diagnosis    Cyst of ovary    Degeneration of posterior vitreous body    Panic disorder    Nexplanon in place       Wt Readings from Last 2 Encounters:   02/28/24 52.7 kg (116 lb 3.2 oz)   01/09/24 53.1 kg (117 lb)     BP Readings from Last 3 Encounters:   02/28/24 106/73   01/09/24 122/84   11/15/23 115/80       Allergies   Allergen Reactions    Penicillins Anaphylaxis and Hives     Hives, itching, bruising     Venlafaxine Itching     Redness and itching    Dogs Other (See Comments)     Nasal congestion       Current Outpatient Medications   Medication    etonogestrel (NEXPLANON) 68 MG IMPL    PARoxetine (PAXIL) 10 MG tablet    adapalene (DIFFERIN) 0.1 % external gel    cyclobenzaprine (FLEXERIL) 5 MG tablet    fexofenadine (ALLEGRA) 180 MG tablet    fluticasone (FLONASE) 50 MCG/ACT nasal spray    hydrOXYzine (VISTARIL) 25 MG capsule    lifitegrast (XIIDRA) 5 % opthalmic solution    loratadine (CLARITIN) 10 MG tablet    mometasone (NASONEX) 50 MCG/ACT nasal spray     No current facility-administered medications for this visit.       Social History     Tobacco Use    Smoking status: Never    Smokeless tobacco: Never   Vaping Use    Vaping Use: Never used   Substance Use Topics    Alcohol use: Not Currently    Drug use: Never       Honoring Choices - Health Care Directive Guide offered to patient at time of visit.    Health Maintenance Due   Topic Date Due    ADVANCE CARE " "PLANNING  Never done    HIV SCREENING  Never done    HEPATITIS C SCREENING  Never done    HEPATITIS B IMMUNIZATION (1 of 3 - 19+ 3-dose series) Never done    PAP  Never done    YEARLY PREVENTIVE VISIT  06/08/2023    INFLUENZA VACCINE (1) Never done    COVID-19 Vaccine (1 - 2023-24 season) Never done       Immunization History   Administered Date(s) Administered    DTaP, Unspecified 06/01/2019    Historical DTP/aP 06/01/2019       No results found for: \"PAP\"    Recent Labs   Lab Test 06/08/22  1127   LDL 95   HDL 40*   TRIG 213*   ALT 45   CR 0.61   GFRESTIMATED >90   ALBUMIN 3.7   POTASSIUM 3.9   TSH 1.54           8/2/2023     9:04 AM 3/1/2023     8:00 AM   PHQ-2 ( 1999 [employer])   Q1: Little interest or pleasure in doing things 0 0   Q2: Feeling down, depressed or hopeless 0 0   PHQ-2 Score 0 0           2/28/2024    12:34 PM   PHQ-9 SCORE   PHQ-9 Total Score MyChart 0   PHQ-9 Total Score 0           2/28/2024    12:34 PM   BERNIE-7 SCORE   Total Score 0 (minimal anxiety)   Total Score 0            No data to display                Ashley Madsen    February 28, 2024 12:42 PM    "

## 2024-02-28 NOTE — PROGRESS NOTES
AnxietyToday's Date: Feb 28, 2024     Patient Malika Garcia 1995 presents to the clinic today to address   Chief Complaint   Patient presents with    Recheck Medication     Medication Evaluation: Paxil (Patient needs refills)              SUBJECTIVE     History of Present Illness:      28-year-old female past medical history anxiety and allergic rhinitis presents to clinic for medication refills.    Anxiety-PHQ-9 0, BERNIE-7 0 today.  Symptoms managed with Paxil 10 mg daily and hydroxyzine as needed.    Allergic rhinitis-patient had appointment with allergist, she has an allergy to dust mites.  She denies symptoms at time of exam, she reports her symptoms increase significantly exposure to dust in her home.  Patient reports she did not tolerate Flonase.  She had mixed results with Claritin and cetirizine.  She did not try Allegra or Nasonex. No other acute concerns/symptoms at time of exam.      Review of Systems   Constitutional, HEENT, cardiovascular, pulmonary, gi and gu systems are negative, except as otherwise noted.      Allergies   Allergen Reactions    Penicillins Anaphylaxis and Hives     Hives, itching, bruising     Venlafaxine Itching     Redness and itching    Dogs Other (See Comments)     Nasal congestion        Current Outpatient Medications   Medication    etonogestrel (NEXPLANON) 68 MG IMPL    fexofenadine (ALLEGRA) 180 MG tablet    hydrOXYzine kay (VISTARIL) 25 MG capsule    PARoxetine (PAXIL) 10 MG tablet    PARoxetine (PAXIL) 10 MG tablet    adapalene (DIFFERIN) 0.1 % external gel    lifitegrast (XIIDRA) 5 % opthalmic solution     No current facility-administered medications for this visit.         Past Medical History:   Diagnosis Date    Anxiety     Bilateral ovarian cysts 2015        Family History   Problem Relation Age of Onset    Hypertension Mother     Diabetes Maternal Grandfather     No Known Problems Half-Sister     No Known Problems Half-Sister     No Known Problems Half-Sister      "    Social History     Tobacco Use    Smoking status: Never    Smokeless tobacco: Never   Vaping Use    Vaping Use: Never used   Substance Use Topics    Alcohol use: Not Currently    Drug use: Never        History   Sexual Activity    Sexual activity: Yes    Partners: Male    Birth control/ protection: Implant            2/28/2024    12:34 PM   PHQ   PHQ-9 Total Score 0   Q9: Thoughts of better off dead/self-harm past 2 weeks Not at all        Immunization History   Administered Date(s) Administered    DTaP, Unspecified 06/01/2019    Historical DTP/aP 06/01/2019      Answers submitted by the patient for this visit:  Patient Health Questionnaire (Submitted on 2/28/2024)  If you checked off any problems, how difficult have these problems made it for you to do your work, take care of things at home, or get along with other people?: Not difficult at all  PHQ9 TOTAL SCORE: 0  BERNIE-7 (Submitted on 2/28/2024)  BERNIE 7 TOTAL SCORE: 0             OBJECTIVE     /73 (BP Location: Right arm, Patient Position: Sitting, Cuff Size: Adult Regular)   Pulse 82   Temp 97.8  F (36.6  C) (Oral)   Resp 18   Ht 1.499 m (4' 11\")   Wt 52.7 kg (116 lb 3.2 oz)   SpO2 98%   BMI 23.47 kg/m       Labs:  Lab Results   Component Value Date    CHOL 178 06/08/2022    TRIG 213 (H) 06/08/2022    HDL 40 (L) 06/08/2022    ALT 45 06/08/2022    AST 21 06/08/2022     06/08/2022    BUN 12 06/08/2022    CO2 26 06/08/2022    TSH 1.54 06/08/2022        Physical Exam  Constitutional:       General: She is not in acute distress.     Appearance: She is not ill-appearing.   HENT:      Right Ear: Tympanic membrane normal.      Left Ear: Tympanic membrane normal.      Nose: Rhinorrhea present. Rhinorrhea is clear.      Right Turbinates: Enlarged.      Left Turbinates: Enlarged.      Mouth/Throat:      Mouth: Mucous membranes are moist.      Pharynx: Oropharynx is clear.   Cardiovascular:      Rate and Rhythm: Normal rate and regular rhythm.      " Heart sounds: Normal heart sounds. No murmur heard.  Pulmonary:      Effort: Pulmonary effort is normal. No respiratory distress.      Breath sounds: Normal breath sounds. No wheezing.   Musculoskeletal:      Cervical back: Neck supple.   Lymphadenopathy:      Cervical: No cervical adenopathy.   Neurological:      General: No focal deficit present.      Mental Status: She is alert.   Psychiatric:         Thought Content: Thought content normal.         Judgment: Judgment normal.               ASSESSMENT/PLAN     1. Anxiety  Symptoms quiescent.  Continue Paxil daily and hydroxyzine as needed.  - PARoxetine (PAXIL) 10 MG tablet; Take 1 tablet (10 mg) by mouth every morning  Dispense: 90 tablet; Refill: 3  - hydrOXYzine kay (VISTARIL) 25 MG capsule; Take 1 capsule (25 mg) by mouth 3 times daily as needed for itching or anxiety  Dispense: 30 capsule; Refill: 3      2. House dust mite allergy  Patient did not tolerate Flonase, she had mixed results with Claritin and cetirizine.  Initiate trial of Allegra and Nasonex today.  - fexofenadine (ALLEGRA) 180 MG tablet; Take 1 tablet (180 mg) by mouth at bedtime  Dispense: 30 tablet; Refill: 3  - mometasone (NASONEX) 50 MCG/ACT nasal spray; Spray 2 sprays into both nostrils at bedtime  Dispense: 17 g; Refill: 3        Follow-Up:  - Follow up in 1 month(s) for allergy med evaluation (this can be remote), or if symptoms worsen or fail to improve.     Options for treatment and follow-up care were reviewed with the patient. Patient engaged in the decision making process and verbalized understanding of the options discussed and agreed with the final plan.  AVS printed and given to patient.    NASIM Zaidi [facility]  [facility]  [address] [address]  [phone]        Note: Chart documentation was done in part with Dragon Voice Recognition software.  Although reviewed after completion, some word and  grammatical errors may remain. Please contact author for any clarification or concerns.

## 2024-03-01 ENCOUNTER — TELEPHONE (OUTPATIENT)
Dept: UROLOGY | Facility: CLINIC | Age: 29
End: 2024-03-01
Payer: COMMERCIAL

## 2024-03-01 NOTE — TELEPHONE ENCOUNTER
[facility] Call Center    Phone Message    May a detailed message be left on voicemail: yes     Reason for Call: Patient has Cysto 03/13/24. Patient called wondering if she needed   for her Appt. Please call back to discuss questions.    Action Taken: Message routed to:  [facility] ([facility] Uro    Travel Screening: Not Applicable

## 2024-03-01 NOTE — TELEPHONE ENCOUNTER
Patient scheduled for in clinic cystoscopy.   Informed her no prep is needed, no anesthesia is given, no  needed.   All questions answered.   Maria G Webb RN on 3/1/2024 at 3:25 PM

## 2024-03-05 ENCOUNTER — PRE VISIT (OUTPATIENT)
Dept: UROLOGY | Facility: CLINIC | Age: 29
End: 2024-03-05

## 2024-03-05 DIAGNOSIS — R39.89 BLADDER PAIN: Primary | ICD-10-CM

## 2024-03-05 NOTE — TELEPHONE ENCOUNTER
Reason for visit: cystoscopy     Relevant information: chronic bladder pain    Records/imaging/labs/orders: in epic    Pt called: No need for a call    At Rooming: ask symptoms,if yes urine + dip    Aquilino Jarrell  3/5/2024  4:31 PM

## 2024-03-06 RX ORDER — LIDOCAINE HYDROCHLORIDE 20 MG/ML
JELLY TOPICAL ONCE
Status: COMPLETED | OUTPATIENT
Start: 2024-03-13 | End: 2024-03-13

## 2024-03-13 ENCOUNTER — OFFICE VISIT (OUTPATIENT)
Dept: UROLOGY | Facility: CLINIC | Age: 29
End: 2024-03-13
Payer: COMMERCIAL

## 2024-03-13 VITALS
SYSTOLIC BLOOD PRESSURE: 111 MMHG | DIASTOLIC BLOOD PRESSURE: 79 MMHG | HEIGHT: 60 IN | BODY MASS INDEX: 23.16 KG/M2 | WEIGHT: 118 LBS | HEART RATE: 85 BPM

## 2024-03-13 DIAGNOSIS — R39.89 BLADDER PAIN: Primary | ICD-10-CM

## 2024-03-13 DIAGNOSIS — R35.0 FREQUENCY OF URINATION: ICD-10-CM

## 2024-03-13 DIAGNOSIS — R39.15 URGENCY OF URINATION: ICD-10-CM

## 2024-03-13 PROCEDURE — 52000 CYSTOURETHROSCOPY: CPT | Performed by: UROLOGY

## 2024-03-13 RX ORDER — OXYBUTYNIN CHLORIDE 5 MG/1
5 TABLET, EXTENDED RELEASE ORAL DAILY
Qty: 30 TABLET | Refills: 11 | Status: SHIPPED | OUTPATIENT
Start: 2024-03-13 | End: 2024-07-31

## 2024-03-13 RX ADMIN — LIDOCAINE HYDROCHLORIDE: 20 JELLY TOPICAL at 11:00

## 2024-03-13 ASSESSMENT — PAIN SCALES - GENERAL: PAINLEVEL: NO PAIN (0)

## 2024-03-13 NOTE — PROGRESS NOTES
Reason for Visit:  Cystoscopy    Clinical Data: Ms. Malika Garcia is a 28 year old female with a hx of bladder pain and some urinary urgency.  She presents for cystoscopy.    Cystoscopy procedure:  Pt. Was consented and placed in the lithotomy position.  She was cleaned and preparred in the usual fashion.  Lidocain gel was inserted into the urethra and given time to take effect.  A 16 fr flexible cystoscope was then inserted through the urethra and into the bladder.  The urethra was wnl.  The bladder was with 1+ trabeculation.  No tumors, diverticulae, or stones.  Bilateral u/o's were effluxing clear urine.  The cystoscope was then withdrawn.  The pt. Tolerated the procedure well.    A/P:  28 year old female with bladder pain and normal cystoscopy.  We discussed DNA testing of the urine and possibly an anticholinergic to help with symptoms of urgency and frequency  -oxybutynin ER 5 mg  -DNA sequencing  -f/u in a month or so virtually to review symptoms    Thank you for allowing me to participate in the care of  Ms. Malika Garcia and I will keep you updated on her progress.    Delfina James MD

## 2024-03-13 NOTE — PATIENT INSTRUCTIONS
"AFTER YOUR CYSTOSCOPY        You have just completed a cystoscopy, or \"cysto\", which allowed your physician to learn more about your bladder (or to remove a stent placed after surgery). We suggest that you continue to avoid caffeine, fruit juice, and alcohol for the next 24 hours, however, you are encouraged to return to your normal activities.         A few things that are considered normal after your cystoscopy:     * Small amount of bleeding (or spotting) that clears within the next 24 hours     * Slight burning sensation with urination     * Sensation to of needing to avoid more frequently     * The feeling of \"air\" in your urine     * Mild discomfort that is relieved with Tylenol        Please contact our office promptly if you:     * Develop a fever above 101 degrees     * Are unable to urinate     * Develop bright red blood that does not stop     * Severe pain or swelling         Please contact our office with any concerns or questions @ [phone]    " Patient requests all Lab, Cardiology, and Radiology Results on their Discharge Instructions

## 2024-03-13 NOTE — LETTER
3/13/2024       RE: Malika Garcia  [address]  [facility] [address]     Dear Colleague,    Thank you for referring your patient, Malika Garcia, to the [facility]OLOGY [facility] [place] [facility]. Please see a copy of my visit note below.    Chief Complaint   Patient presents with    Cystoscopy       Blood pressure 111/79, pulse 85, height 1.524 m (5'), weight 53.5 kg (118 lb). Body mass index is 23.05 kg/m .    Patient Active Problem List   Diagnosis    Cyst of ovary    Degeneration of posterior vitreous body    Panic disorder    Nexplanon in place       Allergies   Allergen Reactions    Penicillins Anaphylaxis and Hives     Hives, itching, bruising     Venlafaxine Itching     Redness and itching    Dogs Other (See Comments)     Nasal congestion       Current Outpatient Medications   Medication Sig Dispense Refill    adapalene (DIFFERIN) 0.1 % external gel Apply topically every 24 hours (Patient not taking: Reported on 1/9/2024) 45 g 1    etonogestrel (NEXPLANON) 68 MG IMPL 1 each by Subdermal route once      fexofenadine (ALLEGRA) 180 MG tablet Take 1 tablet (180 mg) by mouth at bedtime 30 tablet 3    hydrOXYzine kay (VISTARIL) 25 MG capsule Take 1 capsule (25 mg) by mouth 3 times daily as needed for itching or anxiety 30 capsule 3    lifitegrast (XIIDRA) 5 % opthalmic solution Place 1 drop into both eyes 2 times daily (Patient not taking: Reported on 8/2/2023) 60 each 3    mometasone (NASONEX) 50 MCG/ACT nasal spray Spray 2 sprays into both nostrils at bedtime 17 g 3    PARoxetine (PAXIL) 10 MG tablet Take 1 tablet (10 mg) by mouth every morning 90 tablet 3       Social History     Tobacco Use    Smoking status: Never    Smokeless tobacco: Never   Vaping Use    Vaping Use: Never used   Substance Use Topics    Alcohol use: Not Currently    Drug use: Never       Invasive Procedure Safety Checklist:    Procedure: Cystoscopy    Action: Complete  sections and checkboxes as appropriate.    Pre-procedure:  1. Patient ID Verified with 2 identifiers (Leeann and  or MRN) : YES    2. Procedure and site verified with patient/designee (when able) : YES    3. Accurate consent documentation in medical record : YES    4. H&P (or appropriate assessment) documented in medical record : N/A  H&P must be up to 30 days prior to procedure an updated within 24 hours of                 Procedure as applicable.     5. Relevant diagnostic and radiology test results appropriately labeled and displayed as applicable : YES    6. Blood products, implants, devices, and/or special equipment available for the procedure as applicable : YES    7. Procedure site(s) marked with provider initials [Exclusions: none] : NO    8. Marking not required. Reason : Yes  Procedure does not require site marking    Time Out:     Time-Out performed immediately prior to starting procedure, including verbal and active participation of all team members addressing: YES    1. Correct patient identity.  2. Confirmed that the correct side and site are marked.  3. An accurate procedure to be done.  4. Agreement on the procedure to be done.  5. Correct patient position.  6. Relevant images and results are properly labeled and appropriately displayed.  7. The need to administer antibiotics or fluids for irrigation purposes during the procedure as applicable.  8. Safety precautions based on patient history or medication use.    During Procedure: Verification of correct person, site, and procedure occurs any time the responsibility for care of the patient is transferred to another member of the care team.      The following medication was given:     MEDICATION:  Lidocaine without epinephrine 2% jelly  ROUTE: urethral   SITE: urethral   DOSE: 10 mL  LOT #: [id number]  : [employer]  EXPIRATION DATE:   NDC#: [address]-8  Was there drug waste? No    Prior to med admin, verified  patient identity using patient's name and date of birth.  Due to med administration, patient instructed to remain in clinic for 15 minutes  afterwards, and to report any adverse reaction to me immediately.    Drug Amount Wasted:  None.  Vial/Syringe: Syringe      Astrid Maradiaga  3/13/2024    Reason for Visit:  Cystoscopy    Clinical Data: Ms. Malika Garcia is a 28 year old female with a hx of bladder pain and some urinary urgency.  She presents for cystoscopy.    Cystoscopy procedure:  Pt. Was consented and placed in the lithotomy position.  She was cleaned and preparred in the usual fashion.  Lidocain gel was inserted into the urethra and given time to take effect.  A 16 fr flexible cystoscope was then inserted through the urethra and into the bladder.  The urethra was wnl.  The bladder was with 1+ trabeculation.  No tumors, diverticulae, or stones.  Bilateral u/o's were effluxing clear urine.  The cystoscope was then withdrawn.  The pt. Tolerated the procedure well.    A/P:  28 year old female with bladder pain and normal cystoscopy.  We discussed DNA testing of the urine and possibly an anticholinergic to help with symptoms of urgency and frequency  -oxybutynin ER 5 mg  -DNA sequencing  -f/u in a month or so virtually to review symptoms    Thank you for allowing me to participate in the care of  Ms. Malika Garcia and I will keep you updated on her progress.    Delfina James MD

## 2024-03-13 NOTE — PROGRESS NOTES
Chief Complaint   Patient presents with    Cystoscopy       Blood pressure 111/79, pulse 85, height 1.524 m (5'), weight 53.5 kg (118 lb). Body mass index is 23.05 kg/m .    Patient Active Problem List   Diagnosis    Cyst of ovary    Degeneration of posterior vitreous body    Panic disorder    Nexplanon in place       Allergies   Allergen Reactions    Penicillins Anaphylaxis and Hives     Hives, itching, bruising     Venlafaxine Itching     Redness and itching    Dogs Other (See Comments)     Nasal congestion       Current Outpatient Medications   Medication Sig Dispense Refill    adapalene (DIFFERIN) 0.1 % external gel Apply topically every 24 hours (Patient not taking: Reported on 2024) 45 g 1    etonogestrel (NEXPLANON) 68 MG IMPL 1 each by Subdermal route once      fexofenadine (ALLEGRA) 180 MG tablet Take 1 tablet (180 mg) by mouth at bedtime 30 tablet 3    hydrOXYzine kay (VISTARIL) 25 MG capsule Take 1 capsule (25 mg) by mouth 3 times daily as needed for itching or anxiety 30 capsule 3    lifitegrast (XIIDRA) 5 % opthalmic solution Place 1 drop into both eyes 2 times daily (Patient not taking: Reported on 2023) 60 each 3    mometasone (NASONEX) 50 MCG/ACT nasal spray Spray 2 sprays into both nostrils at bedtime 17 g 3    PARoxetine (PAXIL) 10 MG tablet Take 1 tablet (10 mg) by mouth every morning 90 tablet 3       Social History     Tobacco Use    Smoking status: Never    Smokeless tobacco: Never   Vaping Use    Vaping Use: Never used   Substance Use Topics    Alcohol use: Not Currently    Drug use: Never       Invasive Procedure Safety Checklist:    Procedure: Cystoscopy    Action: Complete sections and checkboxes as appropriate.    Pre-procedure:  1. Patient ID Verified with 2 identifiers (Leeann and  or MRN) : YES    2. Procedure and site verified with patient/designee (when able) : YES    3. Accurate consent documentation in medical record : YES    4. H&P (or appropriate assessment) documented in  medical record : N/A  H&P must be up to 30 days prior to procedure an updated within 24 hours of                 Procedure as applicable.     5. Relevant diagnostic and radiology test results appropriately labeled and displayed as applicable : YES    6. Blood products, implants, devices, and/or special equipment available for the procedure as applicable : YES    7. Procedure site(s) marked with provider initials [Exclusions: none] : NO    8. Marking not required. Reason : Yes  Procedure does not require site marking    Time Out:     Time-Out performed immediately prior to starting procedure, including verbal and active participation of all team members addressing: YES    1. Correct patient identity.  2. Confirmed that the correct side and site are marked.  3. An accurate procedure to be done.  4. Agreement on the procedure to be done.  5. Correct patient position.  6. Relevant images and results are properly labeled and appropriately displayed.  7. The need to administer antibiotics or fluids for irrigation purposes during the procedure as applicable.  8. Safety precautions based on patient history or medication use.    During Procedure: Verification of correct person, site, and procedure occurs any time the responsibility for care of the patient is transferred to another member of the care team.      The following medication was given:     MEDICATION:  Lidocaine without epinephrine 2% jelly  ROUTE: urethral   SITE: urethral   DOSE: 10 mL  LOT #: [id number]  : [employer]  EXPIRATION DATE: 9-25  NDC#: [address]-9  Was there drug waste? No    Prior to med admin, verified patient identity using patient's name and date of birth.  Due to med administration, patient instructed to remain in clinic for 15 minutes  afterwards, and to report any adverse reaction to me immediately.    Drug Amount Wasted:  None.  Vial/Syringe: Syringe      Astrid Maradiaga  3/13/2024

## 2024-03-18 ENCOUNTER — PRE VISIT (OUTPATIENT)
Dept: UROLOGY | Facility: CLINIC | Age: 29
End: 2024-03-18
Payer: COMMERCIAL

## 2024-03-18 DIAGNOSIS — R39.15 URGENCY OF URINATION: ICD-10-CM

## 2024-03-18 DIAGNOSIS — R35.0 FREQUENCY OF URINATION: ICD-10-CM

## 2024-03-18 DIAGNOSIS — R30.0 DYSURIA: ICD-10-CM

## 2024-03-18 DIAGNOSIS — R39.89 BLADDER PAIN: Primary | ICD-10-CM

## 2024-03-18 NOTE — CONFIDENTIAL NOTE
Reason for nurse visit: MicroGen urine collection - completed paperwork in nurse visit folder    Diagnosis: bladder pain, urgency of urination    Ordering provider: Dr. Delfina James    Last seen by provider: 3/13/2024       Allergies   Allergen Reactions    Penicillins Anaphylaxis and Hives     Hives, itching, bruising     Venlafaxine Itching     Redness and itching    Dogs Other (See Comments)     Nasal congestion        Astrid Maradiaga

## 2024-03-19 ENCOUNTER — OFFICE VISIT (OUTPATIENT)
Dept: UROLOGY | Facility: CLINIC | Age: 29
End: 2024-03-19
Payer: COMMERCIAL

## 2024-03-19 ENCOUNTER — TRANSFERRED RECORDS (OUTPATIENT)
Dept: HEALTH INFORMATION MANAGEMENT | Facility: CLINIC | Age: 29
End: 2024-03-19

## 2024-03-19 DIAGNOSIS — R39.15 URGENCY OF URINATION: ICD-10-CM

## 2024-03-19 DIAGNOSIS — R39.89 BLADDER PAIN: ICD-10-CM

## 2024-03-19 PROCEDURE — 99207 PR NO CHARGE NURSE ONLY: CPT

## 2024-03-19 NOTE — PROGRESS NOTES
Chief Complaint   Patient presents with    Allied Health Visit     [facility] DNA Sequencing urine collection       Patient Active Problem List   Diagnosis    Cyst of ovary    Degeneration of posterior vitreous body    Panic disorder    Nexplanon in place       Allergies   Allergen Reactions    Penicillins Anaphylaxis and Hives     Hives, itching, bruising     Venlafaxine Itching     Redness and itching    Dogs Other (See Comments)     Nasal congestion       Current Outpatient Medications   Medication Sig Dispense Refill    adapalene (DIFFERIN) 0.1 % external gel Apply topically every 24 hours (Patient not taking: Reported on 1/9/2024) 45 g 1    etonogestrel (NEXPLANON) 68 MG IMPL 1 each by Subdermal route once      fexofenadine (ALLEGRA) 180 MG tablet Take 1 tablet (180 mg) by mouth at bedtime 30 tablet 3    hydrOXYzine kay (VISTARIL) 25 MG capsule Take 1 capsule (25 mg) by mouth 3 times daily as needed for itching or anxiety 30 capsule 3    lifitegrast (XIIDRA) 5 % opthalmic solution Place 1 drop into both eyes 2 times daily (Patient not taking: Reported on 8/2/2023) 60 each 3    mometasone (NASONEX) 50 MCG/ACT nasal spray Spray 2 sprays into both nostrils at bedtime 17 g 3    oxyBUTYnin ER (DITROPAN XL) 5 MG 24 hr tablet Take 1 tablet (5 mg) by mouth daily 30 tablet 11    PARoxetine (PAXIL) 10 MG tablet Take 1 tablet (10 mg) by mouth every morning 90 tablet 3       Social History     Tobacco Use    Smoking status: Never    Smokeless tobacco: Never   Vaping Use    Vaping Use: Never used   Substance Use Topics    Alcohol use: Not Currently    Drug use: Never       Malika Garcia comes into clinic today at the request of Dr. Delfina Madison [facility] Dx urine collection.    Patient diagnosis: bladder pain, urgency or urination    This service provided today was under the direct supervision of Dr. David Olivia, who was available if needed.    Astrid Maradiaga  3/19/2024  2:07 PM

## 2024-03-20 ENCOUNTER — PRE VISIT (OUTPATIENT)
Dept: UROLOGY | Facility: CLINIC | Age: 29
End: 2024-03-20
Payer: COMMERCIAL

## 2024-03-20 NOTE — TELEPHONE ENCOUNTER
Reason for visit: follow up     Relevant information: symptom check, urinary frequency, urgency, bladder pain    Records/imaging/labs/orders: in epic, microgen done 3/19/24    Pt called: No need for a call    At Rooming: virtual visit    Aquilino Jarrell  3/20/2024  9:43 AM

## 2024-03-22 LAB — SCANNED LAB RESULT: ABNORMAL

## 2024-06-06 ENCOUNTER — OFFICE VISIT (OUTPATIENT)
Dept: FAMILY MEDICINE | Facility: CLINIC | Age: 29
End: 2024-06-06
Payer: COMMERCIAL

## 2024-06-06 VITALS
TEMPERATURE: 97.8 F | HEART RATE: 93 BPM | WEIGHT: 107.5 LBS | DIASTOLIC BLOOD PRESSURE: 74 MMHG | RESPIRATION RATE: 17 BRPM | SYSTOLIC BLOOD PRESSURE: 105 MMHG | BODY MASS INDEX: 20.99 KG/M2 | OXYGEN SATURATION: 95 %

## 2024-06-06 DIAGNOSIS — A09 TRAVELER'S DIARRHEA: Primary | ICD-10-CM

## 2024-06-06 LAB
BASOPHILS # BLD AUTO: 0 10E3/UL (ref 0–0.2)
BASOPHILS NFR BLD AUTO: 1 %
EOSINOPHIL # BLD AUTO: 0.3 10E3/UL (ref 0–0.7)
EOSINOPHIL NFR BLD AUTO: 6 %
ERYTHROCYTE [DISTWIDTH] IN BLOOD BY AUTOMATED COUNT: 13.7 % (ref 10–15)
HCT VFR BLD AUTO: 45 % (ref 35–47)
HGB BLD-MCNC: 14.6 G/DL (ref 11.7–15.7)
IMM GRANULOCYTES # BLD: 0 10E3/UL
IMM GRANULOCYTES NFR BLD: 0 %
LYMPHOCYTES # BLD AUTO: 1.3 10E3/UL (ref 0.8–5.3)
LYMPHOCYTES NFR BLD AUTO: 25 %
MCH RBC QN AUTO: 28.1 PG (ref 26.5–33)
MCHC RBC AUTO-ENTMCNC: 32.4 G/DL (ref 31.5–36.5)
MCV RBC AUTO: 87 FL (ref 78–100)
MONOCYTES # BLD AUTO: 0.7 10E3/UL (ref 0–1.3)
MONOCYTES NFR BLD AUTO: 12 %
NEUTROPHILS # BLD AUTO: 3 10E3/UL (ref 1.6–8.3)
NEUTROPHILS NFR BLD AUTO: 56 %
NRBC # BLD AUTO: 0 10E3/UL
NRBC BLD AUTO-RTO: 0 /100
PLATELET # BLD AUTO: 204 10E3/UL (ref 150–450)
RBC # BLD AUTO: 5.19 10E6/UL (ref 3.8–5.2)
WBC # BLD AUTO: 5.3 10E3/UL (ref 4–11)

## 2024-06-06 PROCEDURE — 86709 HEPATITIS A IGM ANTIBODY: CPT | Mod: ORL | Performed by: NURSE PRACTITIONER

## 2024-06-06 PROCEDURE — 85025 COMPLETE CBC W/AUTO DIFF WBC: CPT | Mod: ORL | Performed by: NURSE PRACTITIONER

## 2024-06-06 PROCEDURE — 82040 ASSAY OF SERUM ALBUMIN: CPT | Mod: ORL | Performed by: NURSE PRACTITIONER

## 2024-06-06 ASSESSMENT — ENCOUNTER SYMPTOMS
CHILLS: 1
NAUSEA: 0
VOMITING: 0
DIARRHEA: 1
ABDOMINAL PAIN: 0
FEVER: 0
HEMATOCHEZIA: 0

## 2024-06-06 ASSESSMENT — PAIN SCALES - GENERAL: PAINLEVEL: NO PAIN (0)

## 2024-06-06 NOTE — NURSING NOTE
ROOM:[address] [place], [place] [address]G[place]    Preferred Name: Malika     How did you hear about us?  Current Patient     Services Provided? No    28 year old  Chief Complaint   Patient presents with    Diarrhea     Has had it for over a week, travelled [place]   Had an upset stomach and nausea which have gone away       Blood pressure 105/74, pulse 93, temperature 97.8  F (36.6  C), temperature source Oral, resp. rate 17, weight 48.8 kg (107 lb 8 oz), SpO2 95%. Body mass index is 20.99 kg/m .  BP completed using cuff size:        Patient Active Problem List   Diagnosis    Cyst of ovary    Degeneration of posterior vitreous body    Panic disorder    Nexplanon in place       Wt Readings from Last 2 Encounters:   06/06/24 48.8 kg (107 lb 8 oz)   03/13/24 53.5 kg (118 lb)     BP Readings from Last 3 Encounters:   06/06/24 105/74   03/13/24 111/79   02/28/24 106/73       Allergies   Allergen Reactions    Penicillins Anaphylaxis and Hives     Hives, itching, bruising     Venlafaxine Itching     Redness and itching    Dogs Other (See Comments)     Nasal congestion       Current Outpatient Medications   Medication Sig Dispense Refill    adapalene (DIFFERIN) 0.1 % external gel Apply topically every 24 hours 45 g 1    etonogestrel (NEXPLANON) 68 MG IMPL 1 each by Subdermal route once      PARoxetine (PAXIL) 10 MG tablet Take 1 tablet (10 mg) by mouth every morning 90 tablet 3    fexofenadine (ALLEGRA) 180 MG tablet Take 1 tablet (180 mg) by mouth at bedtime (Patient not taking: Reported on 6/6/2024) 30 tablet 3    hydrOXYzine kay (VISTARIL) 25 MG capsule Take 1 capsule (25 mg) by mouth 3 times daily as needed for itching or anxiety (Patient not taking: Reported on 6/6/2024) 30 capsule 3    lifitegrast (XIIDRA) 5 % opthalmic solution Place 1 drop into both eyes 2 times daily (Patient not taking: Reported on 8/2/2023) 60 each 3    mometasone (NASONEX) 50 MCG/ACT nasal spray Spray 2 sprays into both nostrils at bedtime  "(Patient not taking: Reported on 6/6/2024) 17 g 3    oxyBUTYnin ER (DITROPAN XL) 5 MG 24 hr tablet Take 1 tablet (5 mg) by mouth daily (Patient not taking: Reported on 6/6/2024) 30 tablet 11     No current facility-administered medications for this visit.       Social History     Tobacco Use    Smoking status: Never    Smokeless tobacco: Never   Vaping Use    Vaping status: Never Used   Substance Use Topics    Alcohol use: Not Currently    Drug use: Never       Honoring Choices - Health Care Directive Guide offered to patient at time of visit.    Health Maintenance Due   Topic Date Due    ADVANCE CARE PLANNING  Never done    HIV SCREENING  Never done    HEPATITIS C SCREENING  Never done    HEPATITIS B IMMUNIZATION (1 of 3 - 19+ 3-dose series) Never done    PAP  Never done    YEARLY PREVENTIVE VISIT  06/08/2023    COVID-19 Vaccine (1 - 2023-24 season) Never done       Immunization History   Administered Date(s) Administered    DTaP, Unspecified 06/01/2019    Historical DTP/aP 06/01/2019       No results found for: \"PAP\"    Recent Labs   Lab Test 06/08/22  1127   LDL 95   HDL 40*   TRIG 213*   ALT 45   CR 0.61   GFRESTIMATED >90   ALBUMIN 3.7   POTASSIUM 3.9   TSH 1.54           6/6/2024     8:19 AM 8/2/2023     9:04 AM   PHQ-2 ( 1999 [employer])   Q1: Little interest or pleasure in doing things 0 0   Q2: Feeling down, depressed or hopeless 0 0   PHQ-2 Score 0 0   Q1: Little interest or pleasure in doing things Not at all    Q2: Feeling down, depressed or hopeless Not at all    PHQ-2 Score 0            2/28/2024    12:34 PM   PHQ-9 SCORE   PHQ-9 Total Score MyChart 0   PHQ-9 Total Score 0           2/28/2024    12:34 PM   BERNIE-7 SCORE   Total Score 0 (minimal anxiety)   Total Score 0            No data to display                Hazel Buchanan, EMT    June 6, 2024 8:29 AM    "

## 2024-06-06 NOTE — PROGRESS NOTES
Today's Date: Jun 6, 2024     Patient Malika Garcia 1995 presents to the clinic today to address   Chief Complaint   Patient presents with    Diarrhea     Has had it for over a week, travelled to [place]   Had an upset stomach and nausea which have gone away             SUBJECTIVE     History of Present Illness:    28-year-old female presents with  to discuss diarrhea.  Diarrhea started over 7 days ago while patient was vacationing at an [place] in the [place].  She has been having 2 stools a day.  She denies fevers or blood in her stool.  She endorses chills.  She is concerned that she may have a parasite.  She has tried a home parasite cleanse which she believes has helped though she is still having watery stools.  She has not tried Pepto or Imodium. No other acute concerns/symptoms at time of exam.          Review of Systems   Constitutional:  Positive for chills. Negative for fever.   Gastrointestinal:  Positive for diarrhea. Negative for abdominal pain, hematochezia, nausea and vomiting.     Review of Systems   Constitutional, HEENT, cardiovascular, pulmonary, gi and gu systems are negative, except as otherwise noted.      Allergies   Allergen Reactions    Penicillins Anaphylaxis and Hives     Hives, itching, bruising     Venlafaxine Itching     Redness and itching    Dogs Other (See Comments)     Nasal congestion        Current Outpatient Medications   Medication Instructions    adapalene (DIFFERIN) 0.1 % external gel Topical, EVERY 24 HOURS    etonogestrel (NEXPLANON) 68 MG IMPL 1 each, Subdermal, ONCE    fexofenadine (ALLEGRA) 180 mg, Oral, AT BEDTIME    hydrOXYzine kay (VISTARIL) 25 mg, Oral, 3 TIMES DAILY PRN    lifitegrast (XIIDRA) 5 % opthalmic solution 1 drop, Both Eyes, 2 TIMES DAILY    mometasone (NASONEX) 50 MCG/ACT nasal spray 2 sprays, Both Nostrils, AT BEDTIME    oxyBUTYnin ER (DITROPAN XL) 5 mg, Oral, DAILY    PARoxetine (PAXIL) 10 mg, Oral, EVERY  MORNING       Past Medical History:   Diagnosis Date    Anxiety     Bilateral ovarian cysts 2015        Family History   Problem Relation Age of Onset    Hypertension Mother     Diabetes Maternal Grandfather     No Known Problems Half-Sister     No Known Problems Half-Sister     No Known Problems Half-Sister         Social History     Tobacco Use    Smoking status: Never    Smokeless tobacco: Never   Vaping Use    Vaping status: Never Used   Substance Use Topics    Alcohol use: Not Currently    Drug use: Never        History   Sexual Activity    Sexual activity: Yes    Partners: Male    Birth control/ protection: Implant            2/28/2024    12:34 PM   PHQ   PHQ-9 Total Score 0   Q9: Thoughts of better off dead/self-harm past 2 weeks Not at all        Immunization History   Administered Date(s) Administered    DTaP, Unspecified 06/01/2019    Historical DTP/aP 06/01/2019                 OBJECTIVE     /74 (BP Location: Right arm, Patient Position: Sitting, Cuff Size: Adult Regular)   Pulse 93   Temp 97.8  F (36.6  C) (Oral)   Resp 17   Wt 48.8 kg (107 lb 8 oz)   LMP  (LMP Unknown)   SpO2 95%   BMI 20.99 kg/m       Labs:  Lab Results   Component Value Date    CHOL 178 06/08/2022    TRIG 213 (H) 06/08/2022    HDL 40 (L) 06/08/2022    ALT 45 06/08/2022    AST 21 06/08/2022     06/08/2022    BUN 12 06/08/2022    CO2 26 06/08/2022    TSH 1.54 06/08/2022        Physical Exam  Constitutional:       General: She is not in acute distress.     Appearance: She is not ill-appearing.   HENT:      Mouth/Throat:      Pharynx: Oropharynx is clear.   Eyes:      General: No scleral icterus.  Cardiovascular:      Rate and Rhythm: Normal rate and regular rhythm.      Heart sounds: Normal heart sounds. No murmur heard.  Pulmonary:      Effort: Pulmonary effort is normal. No respiratory distress.      Breath sounds: Normal breath sounds. No wheezing or rales.   Abdominal:      General: Bowel sounds are normal.       Palpations: Abdomen is soft.      Tenderness: There is abdominal tenderness in the epigastric area. There is no guarding.   Musculoskeletal:      Cervical back: Neck supple.      Right lower leg: No edema.      Left lower leg: No edema.   Lymphadenopathy:      Cervical: No cervical adenopathy.   Neurological:      General: No focal deficit present.      Mental Status: She is alert.   Psychiatric:         Thought Content: Thought content normal.         Judgment: Judgment normal.               ASSESSMENT/PLAN     1. Traveler's diarrhea    This Is a pleasant 28-year-old female who presents with her  to discuss traveler's diarrhea that started over 7 days ago while she was traveling to the [place].  Concerning symptoms have lasted for 7 days, advised testing stool which patient was agreeable to.  She would also like some labs completed, ordered as below.  In the interim, advised adequate hydration and trial of OTC Pepto-Bismol or Imodium.        - Enteric Bacteria and Virus Panel by [facility]  - CBC with platelets differential  - Comprehensive metabolic panel  - Hepatitis A antibody IgM        Follow-Up:  - Follow up in 1 month(s) for annual physical, or sooner if symptoms worsen or fail to improve.  Disposition pending results.      Options for treatment and follow-up care were reviewed with the patient. Patient engaged in the decision making process and verbalized understanding of the options discussed and agreed with the final plan.  AVS printed and given to patient.    NASIM Zaidi [facility]  [facility]  [address] [place] [id number]  [phone]        Note: Chart documentation was done in part with Dragon Voice Recognition software.  Although reviewed after completion, some word and grammatical errors may remain. Please contact author for any clarification or concerns.

## 2024-06-07 LAB
ALBUMIN SERPL BCG-MCNC: 4.5 G/DL (ref 3.5–5.2)
ALP SERPL-CCNC: 77 U/L (ref 40–150)
ALT SERPL W P-5'-P-CCNC: 22 U/L (ref 0–50)
ANION GAP SERPL CALCULATED.3IONS-SCNC: 12 MMOL/L (ref 7–15)
AST SERPL W P-5'-P-CCNC: 23 U/L (ref 0–45)
BILIRUB SERPL-MCNC: 0.4 MG/DL
BUN SERPL-MCNC: 9.2 MG/DL (ref 6–20)
CALCIUM SERPL-MCNC: 9.3 MG/DL (ref 8.6–10)
CHLORIDE SERPL-SCNC: 105 MMOL/L (ref 98–107)
CREAT SERPL-MCNC: 0.73 MG/DL (ref 0.51–0.95)
DEPRECATED HCO3 PLAS-SCNC: 24 MMOL/L (ref 22–29)
EGFRCR SERPLBLD CKD-EPI 2021: >90 ML/MIN/1.73M2
GLUCOSE SERPL-MCNC: 70 MG/DL (ref 70–99)
HAV IGM SERPL QL IA: NONREACTIVE
POTASSIUM SERPL-SCNC: 4.4 MMOL/L (ref 3.4–5.3)
PROT SERPL-MCNC: 7.2 G/DL (ref 6.4–8.3)
SODIUM SERPL-SCNC: 141 MMOL/L (ref 135–145)

## 2024-06-13 LAB
ADV 40+41 DNA STL QL NAA+NON-PROBE: NEGATIVE
ASTRO TYP 1-8 RNA STL QL NAA+NON-PROBE: NEGATIVE
C CAYETANENSIS DNA STL QL NAA+NON-PROBE: NEGATIVE
CAMPYLOBACTER DNA SPEC NAA+PROBE: NEGATIVE
CRYPTOSP DNA STL QL NAA+NON-PROBE: NEGATIVE
E COLI O157 DNA STL QL NAA+NON-PROBE: ABNORMAL
E HISTOLYT DNA STL QL NAA+NON-PROBE: NEGATIVE
EAEC ASTA GENE ISLT QL NAA+PROBE: POSITIVE
EC STX1+STX2 GENES STL QL NAA+NON-PROBE: NEGATIVE
EPEC EAE GENE STL QL NAA+NON-PROBE: POSITIVE
ETEC LTA+ST1A+ST1B TOX ST NAA+NON-PROBE: NEGATIVE
G LAMBLIA DNA STL QL NAA+NON-PROBE: NEGATIVE
NOROVIRUS GI+II RNA STL QL NAA+NON-PROBE: NEGATIVE
P SHIGELLOIDES DNA STL QL NAA+NON-PROBE: NEGATIVE
RVA RNA STL QL NAA+NON-PROBE: NEGATIVE
SALMONELLA SP RPOD STL QL NAA+PROBE: NEGATIVE
SAPO I+II+IV+V RNA STL QL NAA+NON-PROBE: NEGATIVE
SHIGELLA SP+EIEC IPAH ST NAA+NON-PROBE: NEGATIVE
V CHOLERAE DNA SPEC QL NAA+PROBE: NEGATIVE
VIBRIO DNA SPEC NAA+PROBE: NEGATIVE
Y ENTEROCOL DNA STL QL NAA+PROBE: NEGATIVE

## 2024-06-13 PROCEDURE — 87507 IADNA-DNA/RNA PROBE TQ 12-25: CPT | Mod: ORL | Performed by: NURSE PRACTITIONER

## 2024-07-29 ENCOUNTER — MYC REFILL (OUTPATIENT)
Dept: FAMILY MEDICINE | Facility: CLINIC | Age: 29
End: 2024-07-29

## 2024-07-29 DIAGNOSIS — F41.9 ANXIETY: ICD-10-CM

## 2024-07-29 RX ORDER — HYDROXYZINE PAMOATE 25 MG/1
25 CAPSULE ORAL 3 TIMES DAILY PRN
Qty: 30 CAPSULE | Refills: 3 | Status: SHIPPED | OUTPATIENT
Start: 2024-07-29

## 2024-07-31 ENCOUNTER — OFFICE VISIT (OUTPATIENT)
Dept: FAMILY MEDICINE | Facility: CLINIC | Age: 29
End: 2024-07-31
Payer: COMMERCIAL

## 2024-07-31 VITALS
HEART RATE: 75 BPM | BODY MASS INDEX: 19.52 KG/M2 | WEIGHT: 103.4 LBS | SYSTOLIC BLOOD PRESSURE: 107 MMHG | TEMPERATURE: 98.1 F | RESPIRATION RATE: 18 BRPM | OXYGEN SATURATION: 95 % | HEIGHT: 61 IN | DIASTOLIC BLOOD PRESSURE: 71 MMHG

## 2024-07-31 DIAGNOSIS — F41.9 ANXIETY: ICD-10-CM

## 2024-07-31 DIAGNOSIS — I83.92 SPIDER VEIN OF LEFT LOWER EXTREMITY: ICD-10-CM

## 2024-07-31 DIAGNOSIS — M79.10 MUSCLE TENSION PAIN: ICD-10-CM

## 2024-07-31 DIAGNOSIS — Z00.00 HEALTHCARE MAINTENANCE: Primary | ICD-10-CM

## 2024-07-31 RX ORDER — METHOCARBAMOL 500 MG/1
500 TABLET, FILM COATED ORAL 4 TIMES DAILY PRN
Qty: 20 TABLET | Refills: 0 | Status: SHIPPED | OUTPATIENT
Start: 2024-07-31

## 2024-07-31 ASSESSMENT — ENCOUNTER SYMPTOMS
COUGH: 0
SORE THROAT: 0
FATIGUE: 0
DIARRHEA: 0
VOMITING: 0
PALPITATIONS: 0
FEVER: 0
NAUSEA: 0
DIZZINESS: 0
HEADACHES: 0
CHILLS: 0
SHORTNESS OF BREATH: 0
NERVOUS/ANXIOUS: 1
DYSPHORIC MOOD: 0
ABDOMINAL PAIN: 0

## 2024-07-31 ASSESSMENT — ANXIETY QUESTIONNAIRES
6. BECOMING EASILY ANNOYED OR IRRITABLE: NOT AT ALL
5. BEING SO RESTLESS THAT IT IS HARD TO SIT STILL: SEVERAL DAYS
GAD7 TOTAL SCORE: 9
7. FEELING AFRAID AS IF SOMETHING AWFUL MIGHT HAPPEN: NOT AT ALL
2. NOT BEING ABLE TO STOP OR CONTROL WORRYING: SEVERAL DAYS
GAD7 TOTAL SCORE: 9
1. FEELING NERVOUS, ANXIOUS, OR ON EDGE: NEARLY EVERY DAY
IF YOU CHECKED OFF ANY PROBLEMS ON THIS QUESTIONNAIRE, HOW DIFFICULT HAVE THESE PROBLEMS MADE IT FOR YOU TO DO YOUR WORK, TAKE CARE OF THINGS AT HOME, OR GET ALONG WITH OTHER PEOPLE: SOMEWHAT DIFFICULT
3. WORRYING TOO MUCH ABOUT DIFFERENT THINGS: SEVERAL DAYS

## 2024-07-31 ASSESSMENT — PAIN SCALES - GENERAL: PAINLEVEL: NO PAIN (0)

## 2024-07-31 ASSESSMENT — PATIENT HEALTH QUESTIONNAIRE - PHQ9: 5. POOR APPETITE OR OVEREATING: NEARLY EVERY DAY

## 2024-07-31 NOTE — PROGRESS NOTES
ANNUAL WELLNESS EXAM     Today's Date: Jul 31, 2024     Patient Malika Garcia 1995 presents to the clinic today for a preventative health visit.         SUBJECTIVE     History of Present Illness:        28-year-old female with past medical history of anxiety presents to clinic for annual physical.    History of anxiety-patient reports increased anxiety symptoms, especially at night.  She reports that she recently purchased a new house in [place] and the change may be contributory.  She plans to start therapy through work (she gets 12 free sessions). She is currently on paxil 10mg/day and hydroxyzine as needed. Sometimes her anxiety manifests as a muscle tension in her bilateral legs.  She would like to have a muscle relaxant to have on hand.  She also noticed a enlarged vein in her left leg and wanted to make sure it is not a blood clot.  She currently denies leg/calf swelling, redness, or pain. No other acute concerns/symptoms at time of exam.        Allergies   Allergen Reactions    Penicillins Anaphylaxis and Hives     Hives, itching, bruising     Venlafaxine Itching     Redness and itching    Dogs Other (See Comments)     Nasal congestion      Current Outpatient Medications   Medication Sig Dispense Refill    adapalene (DIFFERIN) 0.1 % external gel Apply topically every 24 hours 45 g 1    etonogestrel (NEXPLANON) 68 MG IMPL 1 each by Subdermal route once      hydrOXYzine kay (VISTARIL) 25 MG capsule Take 1 capsule (25 mg) by mouth 3 times daily as needed for itching or anxiety 30 capsule 3           PARoxetine (PAXIL) 10 MG tablet Take 1 tablet (10 mg) by mouth every morning 90 tablet 3     No current facility-administered medications for this visit.           Past Medical History:   Diagnosis Date    Anxiety     Bilateral ovarian cysts 2015        Family History   Problem Relation Age of Onset    Hypertension Mother     Diabetes Maternal Grandfather     No Known Problems Half-Sister     No Known  "Problems Half-Sister     No Known Problems Half-Sister         Do you have a first-degree relative with a history of the following:  A. Cancer of the breast or ovaries - No   B. Heart attack, heart pain, or stroke before the age of 55 - No      Social History     Tobacco Use    Smoking status: Never    Smokeless tobacco: Never   Vaping Use    Vaping status: Never Used   Substance Use Topics    Alcohol use: Not Currently    Drug use: Never        History   Sexual Activity    Sexual activity: Yes    Partners: Male    Birth control/ protection: Implant             2/28/2024    12:34 PM   PHQ   PHQ-9 Total Score 0   Q9: Thoughts of better off dead/self-harm past 2 weeks Not at all        Immunization History   Administered Date(s) Administered    DTaP, Unspecified 06/01/2019    Historical DTP/aP 06/01/2019        Health Maintenance Due   Topic Date Due    ADVANCE CARE PLANNING  Never done    HIV SCREENING  Never done    HEPATITIS C SCREENING  Never done    HEPATITIS B IMMUNIZATION (1 of 3 - 19+ 3-dose series) Never done    PAP  Never done    YEARLY PREVENTIVE VISIT  06/08/2023    COVID-19 Vaccine (1 - 2023-24 season) Never done      Health Maintenance components reviewed -  Pap- Unsure ~3 years   TDAP- <10 years  COVID- 2 doses    Diet: in general, a \"healthy\" diet      Exercise: None    The current method of family planning is Nexplanon.     Review of Systems   Constitutional:  Negative for chills, fatigue and fever.   HENT:  Negative for congestion and sore throat.    Respiratory:  Negative for cough and shortness of breath.    Cardiovascular:  Negative for chest pain and palpitations.   Gastrointestinal:  Negative for abdominal pain, diarrhea, nausea and vomiting.   Genitourinary:  Negative for menstrual problem.   Neurological:  Negative for dizziness and headaches.   Psychiatric/Behavioral:  Negative for dysphoric mood and suicidal ideas. The patient is nervous/anxious.    Review of Systems   Constitutional, HEENT, " "cardiovascular, pulmonary, gi and gu systems are negative, except as otherwise noted.           OBJECTIVE     /71 (BP Location: Right arm, Patient Position: Sitting, Cuff Size: Adult Small)   Pulse 75   Temp 98.1  F (36.7  C) (Oral)   Resp 18   Ht 1.544 m (5' 0.8\")   Wt 46.9 kg (103 lb 6.4 oz)   LMP  (LMP Unknown)   SpO2 95%   BMI 19.67 kg/m                 Labs:  Lab Results   Component Value Date    WBC 5.3 06/06/2024    HGB 14.6 06/06/2024    HCT 45.0 06/06/2024     06/06/2024    CHOL 178 06/08/2022    TRIG 213 (H) 06/08/2022    HDL 40 (L) 06/08/2022    ALT 22 06/06/2024    AST 23 06/06/2024     06/06/2024    BUN 9.2 06/06/2024    CO2 24 06/06/2024    TSH 1.54 06/08/2022       Physical Exam  Constitutional:       General: She is not in acute distress.     Appearance: She is not ill-appearing.   HENT:      Right Ear: Tympanic membrane normal.      Left Ear: Tympanic membrane normal.      Nose: No rhinorrhea.      Mouth/Throat:      Pharynx: Oropharynx is clear.   Eyes:      Extraocular Movements: Extraocular movements intact.      Pupils: Pupils are equal, round, and reactive to light.   Cardiovascular:      Rate and Rhythm: Normal rate and regular rhythm.      Heart sounds: Normal heart sounds. No murmur heard.  Pulmonary:      Effort: Pulmonary effort is normal. No respiratory distress.      Breath sounds: Normal breath sounds. No wheezing or rales.   Chest:      Comments: Exam deferred by patient.  Abdominal:      General: Bowel sounds are normal.      Palpations: Abdomen is soft.      Tenderness: There is no abdominal tenderness.   Genitourinary:     Comments: Exam deferred by patient.  Musculoskeletal:      Cervical back: Neck supple.      Right lower leg: No swelling or tenderness. No edema.      Left lower leg: No swelling or tenderness. No edema.        Legs:       Comments: Calf-squeeze tenderness negative bilaterally. No erythema noted bilaterally.     Lymphadenopathy:      " Cervical: No cervical adenopathy.   Neurological:      General: No focal deficit present.      Mental Status: She is alert.      Motor: Motor function is intact. No weakness or abnormal muscle tone.   Psychiatric:         Thought Content: Thought content normal.         Judgment: Judgment normal.               ASSESSMENT/PLAN     1. Healthcare maintenance  -Discussed/Reinforced healthy diety, lifestyle, exercise and safety.  -Recommended completion of routine dental and eye exam.  -Encourage COVID/Flu booster in Fall  - Encouraged Pap with OBGYN to which pt was agreeable.    2. Anxiety  Pt currently on Paxil 10mg and hydroxyzine prn. Offered Paxil titration which pt declined as she would like to pursue therapy through work first.     3. Spider vein of left lower extremity  Advised supportive measures including frequent exercise and compression stockings. Pt would like to discuss with a vascular specialist, referral placed.  Advised on s/s of DVT including unilateral pain, erythema, swelling,etc.  - Vascular Surgery Referral; Future    4. Muscle tension pain  Start methocarbamol prn. Instructed pt not to take hydroxyzine with methocarbamol to avoid excess sedation.   - methocarbamol (ROBAXIN) 500 MG tablet; Take 1 tablet (500 mg) by mouth 4 times daily as needed for muscle spasms  Dispense: 20 tablet; Refill: 0            Follow-Up:  Follow up in one year, or sooner if needed.     Patient engaged in their plan of care. Patient verbalized understanding and agreed with the final plan.  AVS printed and given to patient.    NASIM Zaidi CNP   [facility]  [facility]  [address] [place] [id number]  [phone]

## 2024-07-31 NOTE — NURSING NOTE
"ROOM:1  GARY [facility]    Preferred Name: Malika     How did you hear about us?  Current Patient     Services Provided? No    28 year old  Chief Complaint   Patient presents with    Anxiety     Referral for psychiatrist    Musculoskeletal Problem     Muscle tension from anxiety is causing leg pain       Blood pressure 107/71, pulse 75, temperature 98.1  F (36.7  C), temperature source Oral, resp. rate 18, height 1.544 m (5' 0.8\"), weight 46.9 kg (103 lb 6.4 oz), SpO2 95%. Body mass index is 19.67 kg/m .  BP completed using cuff size:        Patient Active Problem List   Diagnosis    Cyst of ovary    Degeneration of posterior vitreous body    Panic disorder    Nexplanon in place       Wt Readings from Last 2 Encounters:   07/31/24 46.9 kg (103 lb 6.4 oz)   06/06/24 48.8 kg (107 lb 8 oz)     BP Readings from Last 3 Encounters:   07/31/24 107/71   06/06/24 105/74   03/13/24 111/79       Allergies   Allergen Reactions    Penicillins Anaphylaxis and Hives     Hives, itching, bruising     Venlafaxine Itching     Redness and itching    Dogs Other (See Comments)     Nasal congestion       Current Outpatient Medications   Medication Sig Dispense Refill    adapalene (DIFFERIN) 0.1 % external gel Apply topically every 24 hours 45 g 1    etonogestrel (NEXPLANON) 68 MG IMPL 1 each by Subdermal route once      hydrOXYzine kay (VISTARIL) 25 MG capsule Take 1 capsule (25 mg) by mouth 3 times daily as needed for itching or anxiety 30 capsule 3    PARoxetine (PAXIL) 10 MG tablet Take 1 tablet (10 mg) by mouth every morning 90 tablet 3    fexofenadine (ALLEGRA) 180 MG tablet Take 1 tablet (180 mg) by mouth at bedtime (Patient not taking: Reported on 6/6/2024) 30 tablet 3    lifitegrast (XIIDRA) 5 % opthalmic solution Place 1 drop into both eyes 2 times daily (Patient not taking: Reported on 8/2/2023) 60 each 3    mometasone (NASONEX) 50 MCG/ACT nasal spray Spray 2 sprays into both nostrils at bedtime (Patient not taking: " "Reported on 6/6/2024) 17 g 3    oxyBUTYnin ER (DITROPAN XL) 5 MG 24 hr tablet Take 1 tablet (5 mg) by mouth daily (Patient not taking: Reported on 6/6/2024) 30 tablet 11     No current facility-administered medications for this visit.       Social History     Tobacco Use    Smoking status: Never    Smokeless tobacco: Never   Vaping Use    Vaping status: Never Used   Substance Use Topics    Alcohol use: Not Currently    Drug use: Never       Honoring Choices - Health Care Directive Guide offered to patient at time of visit.    Health Maintenance Due   Topic Date Due    ADVANCE CARE PLANNING  Never done    HIV SCREENING  Never done    HEPATITIS C SCREENING  Never done    HEPATITIS B IMMUNIZATION (1 of 3 - 19+ 3-dose series) Never done    PAP  Never done    YEARLY PREVENTIVE VISIT  06/08/2023    COVID-19 Vaccine (1 - 2023-24 season) Never done       Immunization History   Administered Date(s) Administered    DTaP, Unspecified 06/01/2019    Historical DTP/aP 06/01/2019       No results found for: \"PAP\"    Recent Labs   Lab Test 06/06/24  0913 06/08/22  1127   LDL  --  95   HDL  --  40*   TRIG  --  213*   ALT 22 45   CR 0.73 0.61   GFRESTIMATED >90 >90   ALBUMIN 4.5 3.7   POTASSIUM 4.4 3.9   TSH  --  1.54           7/31/2024     9:19 AM 6/6/2024     8:19 AM   PHQ-2 ( 1999 Pf[employer])   Q1: Little interest or pleasure in doing things 0 0   Q2: Feeling down, depressed or hopeless 0 0   PHQ-2 Score 0 0   Q1: Little interest or pleasure in doing things Not at all Not at all   Q2: Feeling down, depressed or hopeless Not at all Not at all   PHQ-2 Score 0 0           2/28/2024    12:34 PM   PHQ-9 SCORE   PHQ-9 Total Score MyChart 0   PHQ-9 Total Score 0           2/28/2024    12:34 PM 7/31/2024     9:24 AM   BERNIE-7 SCORE   Total Score 0 (minimal anxiety)    Total Score 0 9            No data to display                Hazel Buchanan, EMT    July 31, 2024 9:27 AM    "

## 2024-07-31 NOTE — PATIENT INSTRUCTIONS
Please complete Pap through OBGYN.    Do not take methocarbamal with hydroxyzine.      How do you stop spider veins?  Self-care can sometimes help prevent new spider veins from forming. Tips include:    Avoid sitting or standing for too long at a time. If you need to sit for a while, stand up every 30 minutes and walk around. If you need to stand for a while, sit down and take a quick break every 30 minutes.  Avoid wearing tight clothing. Such clothing can interfere with your normal blood circulation and cause spider veins to form.  Exercise regularly. Moving around can help increase blood flow in your legs. Ask your provider before starting a new exercise plan.  Flex your feet and ankles. If you need to stay seated or in the same position for a while, move your feet. Flex your ankles up and down. Try to squeeze and relax your calf muscles to support blood flow in your legs.  Keep a weight that s healthy for you. A healthy body weight can limit pressure on your blood vessel walls so they function normally.  Lift up (elevate) your legs. Try to do this at least twice a day for 30 minutes each time. Your legs should be at least as high as your heart.  Wear compression stockings. Your provider may recommend compression stockings to improve blood flow in your legs. You may also need to wear them for a short time after treatment for spider veins. Always talk to your provider before starting any form of compression therapy.

## 2024-09-17 ENCOUNTER — LAB REQUISITION (OUTPATIENT)
Dept: LAB | Facility: CLINIC | Age: 29
End: 2024-09-17
Payer: COMMERCIAL

## 2024-09-17 DIAGNOSIS — R14.0 ABDOMINAL DISTENSION (GASEOUS): ICD-10-CM

## 2024-09-17 DIAGNOSIS — M25.50 PAIN IN UNSPECIFIED JOINT: ICD-10-CM

## 2024-09-19 PROCEDURE — 87338 HPYLORI STOOL AG IA: CPT | Mod: ORL | Performed by: STUDENT IN AN ORGANIZED HEALTH CARE EDUCATION/TRAINING PROGRAM

## 2024-09-20 LAB — H PYLORI AG STL QL IA: NEGATIVE

## 2024-11-25 ENCOUNTER — OFFICE VISIT (OUTPATIENT)
Dept: OBGYN | Facility: CLINIC | Age: 29
End: 2024-11-25
Attending: STUDENT IN AN ORGANIZED HEALTH CARE EDUCATION/TRAINING PROGRAM
Payer: COMMERCIAL

## 2024-11-25 VITALS
HEART RATE: 76 BPM | HEIGHT: 61 IN | SYSTOLIC BLOOD PRESSURE: 110 MMHG | DIASTOLIC BLOOD PRESSURE: 77 MMHG | WEIGHT: 114.4 LBS | BODY MASS INDEX: 21.6 KG/M2

## 2024-11-25 DIAGNOSIS — Z30.46 ENCOUNTER FOR NEXPLANON REMOVAL: Primary | ICD-10-CM

## 2024-11-25 PROCEDURE — 99213 OFFICE O/P EST LOW 20 MIN: CPT | Performed by: STUDENT IN AN ORGANIZED HEALTH CARE EDUCATION/TRAINING PROGRAM

## 2024-11-25 NOTE — PATIENT INSTRUCTIONS
Thank you for trusting us with your care!   Please be aware, if you are on Mychart, you may see your results prior to your providers review. If labs are abnormal, we will call or message you on [employer] with a follow up plan.    If you need to contact us for questions about:  Symptoms, Scheduling & Medical Questions; Non-urgent (2-3 day response) [employer] message, Urgent (needing response today) [phone] (if after 3:30pm next day response)   Prescriptions: Please call your Pharmacy   Billing: [facility] [phone] or [facility]:[phone]

## 2024-11-25 NOTE — LETTER
"11/25/2024       RE: Malika Garcia  [address]  [facility] [address]     Dear Colleague,    Thank you for referring your patient, Malika Garcia, to the [facility] [place] [facility]. Please see a copy of my visit note below.    [facility]  Follow-Up Visit    S: Malika Garcia is a 29 year old G0 here for nexplanon removal. Does not desire any other contraception at this time but would like contraception sheet and will look through options.    O:  /77   Pulse 76   Ht 1.544 m (5' 0.8\")   Wt 51.9 kg (114 lb 6.4 oz)   BMI 21.76 kg/m    Gen: Well-appearing, NAD  HEENT: Normocephalic, atraumatic  CV:        Well perfused; no LE edema  Pulm:  Normal respiratory effort and rate  Abd: Soft, non-tender, non-distended    Nexplanon removal  Consent: Risks, benefits of treatment, and treatment were discussed. Patient's questions were elicited and answered. Written consent completed today for Nexplanon removal with risks of infection and bleeding discussed with patient.    Skin Preparation: Betadine     Anesthesia: 10 mL 1% lidocaine without EPI     Technique: The patient's left arm was flexed and externally rotated with her wrist parallel to her ear. The Nexplanon was easily palpated. The skin was numbed with 10 cc 1% lidocaine and then an 11 blade scalpel was used to make a small incision at the distal end of the appreciated Nexplanon lucas. The Nexplanon lucas was grasped with a hemostat and removed and discarded. Pressure and silver nitrate were placed on the incision and was made hemostatic. The incision was covered with steristrips. The patient's arm was wrapped with Kerlix for a pressure dressing. She will remove this tomorrow.    EBL: <5 mL     Complications: None     Patient tolerated the procedure well.    A/P:  Malika Garcia is a 29 year old G0 here for nexplanon removal.  - At this time she does not want any other methods of " contraception, but will review options and call our clinic to let us know what she decides. Contraception info sheet provided    Fay Gaitan MD  [facility], Ob/Gyn  11/26/2024 12:33 PM        Again, thank you for allowing me to participate in the care of your patient.      Sincerely,    Fay Gaitan MD

## 2024-11-26 NOTE — PROGRESS NOTES
"[facility]  Follow-Up Visit    S: Malika Garcia is a 29 year old G0 here for nexplanon removal. Does not desire any other contraception at this time but would like contraception sheet and will look through options.    O:  /77   Pulse 76   Ht 1.544 m (5' 0.8\")   Wt 51.9 kg (114 lb 6.4 oz)   BMI 21.76 kg/m    Gen: Well-appearing, NAD  HEENT: Normocephalic, atraumatic  CV:        Well perfused; no LE edema  Pulm:  Normal respiratory effort and rate  Abd: Soft, non-tender, non-distended    Nexplanon removal  Consent: Risks, benefits of treatment, and treatment were discussed. Patient's questions were elicited and answered. Written consent completed today for Nexplanon removal with risks of infection and bleeding discussed with patient.    Skin Preparation: Betadine     Anesthesia: 10 mL 1% lidocaine without EPI     Technique: The patient's left arm was flexed and externally rotated with her wrist parallel to her ear. The Nexplanon was easily palpated. The skin was numbed with 10 cc 1% lidocaine and then an 11 blade scalpel was used to make a small incision at the distal end of the appreciated Nexplanon lucas. The Nexplanon lucas was grasped with a hemostat and removed and discarded. Pressure and silver nitrate were placed on the incision and was made hemostatic. The incision was covered with steristrips. The patient's arm was wrapped with Kerlix for a pressure dressing. She will remove this tomorrow.    EBL: <5 mL     Complications: None     Patient tolerated the procedure well.    A/P:  Malika Garcia is a 29 year old G0 here for nexplanon removal.  - At this time she does not want any other methods of contraception, but will review options and call our clinic to let us know what she decides. Contraception info sheet provided    Fay Gaitan MD  [facility], Ob/Gyn  11/26/2024 12:33 PM      "

## 2025-01-06 ENCOUNTER — LAB REQUISITION (OUTPATIENT)
Dept: LAB | Facility: CLINIC | Age: 30
End: 2025-01-06
Payer: COMMERCIAL

## 2025-01-06 DIAGNOSIS — N92.6 IRREGULAR MENSTRUATION, UNSPECIFIED: ICD-10-CM

## 2025-01-06 PROCEDURE — 83498 ASY HYDROXYPROGESTERONE 17-D: CPT | Mod: ORL | Performed by: STUDENT IN AN ORGANIZED HEALTH CARE EDUCATION/TRAINING PROGRAM

## 2025-01-06 PROCEDURE — 84403 ASSAY OF TOTAL TESTOSTERONE: CPT | Mod: ORL | Performed by: STUDENT IN AN ORGANIZED HEALTH CARE EDUCATION/TRAINING PROGRAM

## 2025-01-06 PROCEDURE — 82670 ASSAY OF TOTAL ESTRADIOL: CPT | Mod: ORL | Performed by: STUDENT IN AN ORGANIZED HEALTH CARE EDUCATION/TRAINING PROGRAM

## 2025-01-07 LAB — ESTRADIOL SERPL-MCNC: 197 PG/ML

## 2025-01-08 LAB — TESTOST SERPL-MCNC: 57 NG/DL (ref 8–60)

## 2025-01-09 LAB — 17OHP SERPL-MCNC: 1043 NG/DL

## (undated) RX ORDER — LIDOCAINE HYDROCHLORIDE 10 MG/ML
INJECTION, SOLUTION INFILTRATION; PERINEURAL
Status: DISPENSED
Start: 2024-11-25

## (undated) RX ORDER — LIDOCAINE HYDROCHLORIDE 20 MG/ML
JELLY TOPICAL
Status: DISPENSED
Start: 2024-03-13